# Patient Record
Sex: FEMALE | Race: WHITE | Employment: OTHER | URBAN - NONMETROPOLITAN AREA
[De-identification: names, ages, dates, MRNs, and addresses within clinical notes are randomized per-mention and may not be internally consistent; named-entity substitution may affect disease eponyms.]

---

## 2017-04-10 ENCOUNTER — OFFICE VISIT (OUTPATIENT)
Dept: PHYSICAL MEDICINE AND REHAB | Age: 62
End: 2017-04-10

## 2017-04-10 VITALS
BODY MASS INDEX: 25.3 KG/M2 | HEIGHT: 61 IN | DIASTOLIC BLOOD PRESSURE: 87 MMHG | HEART RATE: 97 BPM | WEIGHT: 134 LBS | SYSTOLIC BLOOD PRESSURE: 133 MMHG

## 2017-04-10 DIAGNOSIS — G25.2 ACTION TREMOR: Primary | ICD-10-CM

## 2017-04-10 PROCEDURE — 99213 OFFICE O/P EST LOW 20 MIN: CPT | Performed by: PSYCHIATRY & NEUROLOGY

## 2017-05-24 LAB — VITAMIN D 25-HYDROXY: 44 NG/ML

## 2017-05-30 ENCOUNTER — TELEPHONE (OUTPATIENT)
Dept: FAMILY MEDICINE CLINIC | Age: 62
End: 2017-05-30

## 2017-05-30 DIAGNOSIS — E55.9 VITAMIN D DEFICIENCY: Primary | ICD-10-CM

## 2017-10-12 ENCOUNTER — OFFICE VISIT (OUTPATIENT)
Dept: ENT CLINIC | Age: 62
End: 2017-10-12
Payer: COMMERCIAL

## 2017-10-12 VITALS
TEMPERATURE: 98.3 F | HEART RATE: 80 BPM | WEIGHT: 130.6 LBS | DIASTOLIC BLOOD PRESSURE: 60 MMHG | HEIGHT: 62 IN | BODY MASS INDEX: 24.03 KG/M2 | SYSTOLIC BLOOD PRESSURE: 102 MMHG | RESPIRATION RATE: 14 BRPM

## 2017-10-12 DIAGNOSIS — E04.2 MULTINODULAR GOITER: Primary | ICD-10-CM

## 2017-10-12 PROCEDURE — 99212 OFFICE O/P EST SF 10 MIN: CPT | Performed by: OTOLARYNGOLOGY

## 2017-10-12 ASSESSMENT — ENCOUNTER SYMPTOMS
STRIDOR: 0
COLOR CHANGE: 0
CHOKING: 0
SHORTNESS OF BREATH: 0
APNEA: 0
NAUSEA: 0
SINUS PRESSURE: 0
VOMITING: 0
TROUBLE SWALLOWING: 1
SORE THROAT: 0
CHEST TIGHTNESS: 0
RHINORRHEA: 0
ABDOMINAL PAIN: 0
VOICE CHANGE: 0
FACIAL SWELLING: 0
DIARRHEA: 0
COUGH: 0
WHEEZING: 0

## 2017-10-12 NOTE — PROGRESS NOTES
[facility]  [facility]  [address]  [place]  Dept: [phone]  Dept Fax: [phone]  Loc: [phone]    Brock Ndiaye is a 60 y.o. female who was referred by No ref. provider found for:  Chief Complaint   Patient presents with    Thyroid Problem     New patient is here for thyroid nodule. last US 9/22/15   . HPI:     Brock Ndiaye is a 60 y.o. female who presents today for Follow-up on her nodular thyroid gland. Her last ultrasound was in 2015. That showed 6 nodules in the left side, only one of them greater than a centimeter. That one was 14 mm and had been previously biopsied. Ajith Stewart History: Allergies   Allergen Reactions    Latex Hives    Prolia [Denosumab] Other (See Comments)     Hand and feet tingle and throat tightness    Amoxicillin Rash     Current Outpatient Prescriptions   Medication Sig Dispense Refill    ADVAIR DISKUS 100-50 MCG/DOSE diskus inhaler USE 1 INHALATION TWICE A  each 3    docusate sodium (COLACE) 100 MG capsule Take 100 mg by mouth 2 times daily as needed       Calcium Citrate-Vitamin D (CALCIUM CITRATE + PO) Take 630 mg by mouth daily. Two daily      Cholecalciferol (VITAMIN D3 PO) Take 5,000 Units by mouth daily       magnesium 30 MG tablet Take 30 mg by mouth 2 times daily.  MILK THISTLE PO Take  by mouth.  Coenzyme Q10 (COQ10 PO) Take  by mouth.  Omega-3 Fatty Acids (OMEGA 3 PO) Take  by mouth.  Cyanocobalamin (VITAMIN B 12 PO) Take  by mouth.  multivitamin (THERAGRAN) per tablet Take 1 tablet by mouth daily.         ZINC PO Take by mouth      TURMERIC PO Take by mouth      levalbuterol (XOPENEX HFA) 45 MCG/ACT inhaler Inhale 1 puff into the lungs every 4 hours as needed for Wheezing 2 Inhaler 3    raloxifene (EVISTA) 60 MG tablet Take 1 tablet by mouth daily 90 tablet 3    sulfamethoxazole-trimethoprim (BACTRIM DS) 800-160 MG per tablet Take 1 tablet by mouth 2 times daily for 10 days 20 tablet 0     No current facility-administered medications for this visit. Past Medical History:   Diagnosis Date    Asthma     Essential tremor 2011    Fx. left wrist 5/20/14    GERD (gastroesophageal reflux disease)     Inflammatory polyps of colon ([facility] [address] 12/2013    Osteoarthritis     cervical    Osteoporosis 2013    Tremors of nervous system     Begnin essential tremors      Past Surgical History:   Procedure Laterality Date    APPENDECTOMY      HYSTERECTOMY       Family History   Problem Relation Age of Onset    Cancer Mother     Other Mother      tremor    Heart Disease Father     Kidney Disease Father     Other Father      Inability to produce blood    Other Maternal Grandmother      tremor     Social History   Substance Use Topics    Smoking status: Current Every Day Smoker     Packs/day: 0.50     Years: 2.00     Types: Cigarettes     Last attempt to quit: 12/1/2015    Smokeless tobacco: Never Used    Alcohol use 0.0 oz/week      Comment: occ       Subjective:      Review of Systems   Constitutional: Negative for activity change, appetite change, chills, diaphoresis, fatigue, fever and unexpected weight change. HENT: Positive for trouble swallowing. Negative for congestion, dental problem, ear discharge, ear pain, facial swelling, hearing loss, mouth sores, nosebleeds, postnasal drip, rhinorrhea, sinus pressure, sneezing, sore throat, tinnitus and voice change. Eyes: Negative for visual disturbance. Respiratory: Negative for apnea, cough, choking, chest tightness, shortness of breath, wheezing and stridor. Cardiovascular: Negative for chest pain, palpitations and leg swelling. Gastrointestinal: Negative for abdominal pain, diarrhea, nausea and vomiting. Endocrine: Negative for cold intolerance, heat intolerance, polydipsia and polyuria. Genitourinary: Negative for dysuria, enuresis and hematuria.    Musculoskeletal: Negative for arthralgias, gait problem, neck pain and neck stiffness. Skin: Negative for color change and rash. Allergic/Immunologic: Negative for environmental allergies, food allergies and immunocompromised state. Neurological: Positive for tremors. Negative for dizziness, syncope, facial asymmetry, speech difficulty, light-headedness and headaches. Hematological: Negative for adenopathy. Does not bruise/bleed easily. Psychiatric/Behavioral: Negative for confusion and sleep disturbance. The patient is not nervous/anxious. Objective:   /60 (Site: Left Arm, Position: Sitting)   Pulse 80   Temp 98.3 °F (36.8 °C) (Oral)   Resp 14   Ht 5' 1.5\" (1.562 m)   Wt 130 lb 9.6 oz (59.2 kg)   BMI 24.28 kg/m²     Physical Exam   Constitutional: She is oriented to person, place, and time. She appears well-developed and well-nourished. She is cooperative. HENT:   Head: Normocephalic and atraumatic. Head is without laceration. Right Ear: Hearing, tympanic membrane, external ear and ear canal normal. No drainage or swelling. Tympanic membrane is not scarred, not perforated and not erythematous. Tympanic membrane mobility is normal (on pneumatic massage). No middle ear effusion. Left Ear: Hearing, tympanic membrane, external ear and ear canal normal. No drainage or swelling. Tympanic membrane is not scarred, not perforated and not erythematous. Tympanic membrane mobility is normal (on pneumatic massage). No middle ear effusion. Nose: Nose normal. No mucosal edema, rhinorrhea or septal deviation. Mouth/Throat: Uvula is midline, oropharynx is clear and moist and mucous membranes are normal. Mucous membranes are not pale and not dry. No oral lesions. No uvula swelling. No oropharyngeal exudate, posterior oropharyngeal edema or posterior oropharyngeal erythema.    Turbinates: normal  LIps: lips normal    Mallampati 1  Tonsils:Unremarkable  Base of tongue: symmetric,  Larynx, mirror exam: unable due to gag reflex     Eyes:

## 2017-10-17 ENCOUNTER — HOSPITAL ENCOUNTER (OUTPATIENT)
Dept: ULTRASOUND IMAGING | Age: 62
Discharge: HOME | End: 2017-10-17
Payer: COMMERCIAL

## 2017-10-17 DIAGNOSIS — E04.2 MULTINODULAR GOITER: ICD-10-CM

## 2017-10-17 PROCEDURE — 76536 US EXAM OF HEAD AND NECK: CPT

## 2017-10-18 ENCOUNTER — HOSPITAL ENCOUNTER (OUTPATIENT)
Dept: ULTRASOUND IMAGING | Age: 62
Discharge: HOME | End: 2017-10-18
Payer: COMMERCIAL

## 2017-10-18 DIAGNOSIS — Z00.6 EXAMINATION FOR NORMAL COMPARISON FOR CLINICAL RESEARCH: ICD-10-CM

## 2017-10-18 PROCEDURE — 3209999900 US COMPARISON OF OUTSIDE FILMS

## 2017-10-23 ENCOUNTER — OFFICE VISIT (OUTPATIENT)
Dept: FAMILY MEDICINE CLINIC | Age: 62
End: 2017-10-23

## 2017-10-23 VITALS
WEIGHT: 131.38 LBS | RESPIRATION RATE: 16 BRPM | HEIGHT: 62 IN | SYSTOLIC BLOOD PRESSURE: 130 MMHG | HEART RATE: 84 BPM | DIASTOLIC BLOOD PRESSURE: 74 MMHG | BODY MASS INDEX: 24.18 KG/M2

## 2017-10-23 DIAGNOSIS — Z00.00 WELL ADULT EXAM: Primary | ICD-10-CM

## 2017-10-23 DIAGNOSIS — M81.0 AGE RELATED OSTEOPOROSIS, UNSPECIFIED PATHOLOGICAL FRACTURE PRESENCE: ICD-10-CM

## 2017-10-23 DIAGNOSIS — Z23 NEED FOR IMMUNIZATION AGAINST INFLUENZA: ICD-10-CM

## 2017-10-23 DIAGNOSIS — E55.9 VITAMIN D DEFICIENCY: ICD-10-CM

## 2017-10-23 DIAGNOSIS — Z12.31 SCREENING MAMMOGRAM, ENCOUNTER FOR: ICD-10-CM

## 2017-10-23 DIAGNOSIS — J45.909 RAD (REACTIVE AIRWAY DISEASE), UNSPECIFIED ASTHMA SEVERITY, UNCOMPLICATED: ICD-10-CM

## 2017-10-23 DIAGNOSIS — E78.00 PURE HYPERCHOLESTEROLEMIA: ICD-10-CM

## 2017-10-23 DIAGNOSIS — J06.9 UPPER RESPIRATORY TRACT INFECTION, UNSPECIFIED TYPE: ICD-10-CM

## 2017-10-23 DIAGNOSIS — E04.1 THYROID NODULE: ICD-10-CM

## 2017-10-23 PROCEDURE — 90471 IMMUNIZATION ADMIN: CPT | Performed by: FAMILY MEDICINE

## 2017-10-23 PROCEDURE — 90674 CCIIV4 VAC NO PRSV 0.5 ML IM: CPT | Performed by: FAMILY MEDICINE

## 2017-10-23 PROCEDURE — 99396 PREV VISIT EST AGE 40-64: CPT | Performed by: FAMILY MEDICINE

## 2017-10-23 RX ORDER — RALOXIFENE HYDROCHLORIDE 60 MG/1
60 TABLET, FILM COATED ORAL DAILY
Qty: 90 TABLET | Refills: 3 | Status: SHIPPED | OUTPATIENT
Start: 2017-10-23 | End: 2018-10-18 | Stop reason: SDUPTHER

## 2017-10-23 RX ORDER — SULFAMETHOXAZOLE AND TRIMETHOPRIM 800; 160 MG/1; MG/1
1 TABLET ORAL 2 TIMES DAILY
Qty: 20 TABLET | Refills: 0 | Status: SHIPPED | OUTPATIENT
Start: 2017-10-23 | End: 2018-02-05 | Stop reason: SDUPTHER

## 2017-10-23 RX ORDER — LEVALBUTEROL TARTRATE 45 UG/1
1 AEROSOL, METERED ORAL EVERY 4 HOURS PRN
Qty: 2 INHALER | Refills: 3 | Status: SHIPPED | OUTPATIENT
Start: 2017-10-23 | End: 2017-10-31 | Stop reason: SDUPTHER

## 2017-10-23 ASSESSMENT — PATIENT HEALTH QUESTIONNAIRE - PHQ9
SUM OF ALL RESPONSES TO PHQ9 QUESTIONS 1 & 2: 0
1. LITTLE INTEREST OR PLEASURE IN DOING THINGS: 0
2. FEELING DOWN, DEPRESSED OR HOPELESS: 0
SUM OF ALL RESPONSES TO PHQ QUESTIONS 1-9: 0

## 2017-10-23 ASSESSMENT — ENCOUNTER SYMPTOMS
COUGH: 1
SINUS PRESSURE: 0
CONSTIPATION: 0
SHORTNESS OF BREATH: 0

## 2017-10-23 NOTE — PROGRESS NOTES
Subjective:      Patient ID: Tre Elias is a 60 y.o. female. HPI  Well Adult Physical   Patient here for a comprehensive physical exam.The patient reports problems - there has been some loose cough the past several weeks. She continues to smoke and we discussed that  Do you take any herbs or supplements that were not prescribed by a doctor? yes Are you taking calcium supplements? yes Are you taking aspirin daily? no   History:  She is not seeing a GYN    Review of Systems   Constitutional: Negative for fatigue. HENT: Positive for congestion. Negative for sinus pressure. Eyes: Negative for visual disturbance. Respiratory: Positive for cough. Negative for shortness of breath. Cardiovascular: Negative for chest pain. Gastrointestinal: Negative for constipation. Genitourinary: Negative. Musculoskeletal: Negative for arthralgias. Skin: Negative for rash. Neurological: Positive for tremors. Negative for headaches. The patient's medications, allergies, past medical problems, surgical, social, and family histories were reviewed and updated as needed. Objective:   Physical Exam   Constitutional: She is oriented to person, place, and time. She appears well-developed and well-nourished. No distress. HENT:   Head: Normocephalic and atraumatic. Right Ear: External ear normal.   Left Ear: External ear normal.   Nose: Nose normal.   Mouth/Throat: Oropharynx is clear and moist. No oropharyngeal exudate. Nasal congestion   Eyes: Conjunctivae are normal. No scleral icterus. Neck: Neck supple. Carotid bruit is not present. No tracheal deviation present. No thyromegaly present. Cardiovascular: Normal rate, regular rhythm, normal heart sounds and intact distal pulses. Pulmonary/Chest: Effort normal and breath sounds normal.   Abdominal: Soft. Bowel sounds are normal. She exhibits no mass. Musculoskeletal: She exhibits no edema. Lymphadenopathy:     She has no cervical adenopathy. Neurological: She is alert and oriented to person, place, and time. Skin: Skin is warm and dry. Psychiatric: She has a normal mood and affect. Her behavior is normal.   Blood pressure 130/74, pulse 84, resp. rate 16, height 5' 1.5\" (1.562 m), weight 131 lb 6 oz (59.6 kg), not currently breastfeeding. Assessment:      1. Well adult exam     2. Need for immunization against influenza  INFLUENZA, MDCK QUADV, 4 YRS AND OLDER, IM, PF, PREFILL SYR OR SDV, 0.5ML (FLUCELVAX QUADV, PF)   3. RAD (reactive airway disease), unspecified asthma severity, uncomplicated  levalbuterol (XOPENEX HFA) 45 MCG/ACT inhaler   4. Age related osteoporosis, unspecified pathological fracture presence  raloxifene (EVISTA) 60 MG tablet    [facility] Dexa Bone Density Scan    Comprehensive Metabolic Panel   5. Vitamin D deficiency  Vitamin D 25 Hydroxy   6. Screening mammogram, encounter for  [facility] DIGITAL SCREEN W OR WO CAD BILATERAL   7. Pure hypercholesterolemia  Comprehensive Metabolic Panel    Lipid Panel   8. Thyroid nodule  TSH without Reflex    T4, Free   9. Upper respiratory tract infection, unspecified type             Plan:      Keep working at not smoking.   Do the fasting lab, mammogram, and DEXA scan in mid December   See me in 6 month

## 2017-10-23 NOTE — PATIENT INSTRUCTIONS
Keep working at not smoking.   Do the fasting lab, mammogram, and DEXA scan in mid December   See me in 6 month

## 2017-10-24 ENCOUNTER — OFFICE VISIT (OUTPATIENT)
Dept: ENT CLINIC | Age: 62
End: 2017-10-24
Payer: COMMERCIAL

## 2017-10-24 VITALS
DIASTOLIC BLOOD PRESSURE: 64 MMHG | SYSTOLIC BLOOD PRESSURE: 106 MMHG | HEART RATE: 60 BPM | TEMPERATURE: 98 F | WEIGHT: 130.7 LBS | RESPIRATION RATE: 14 BRPM | BODY MASS INDEX: 24.05 KG/M2 | HEIGHT: 62 IN

## 2017-10-24 DIAGNOSIS — R06.83 SNORING: ICD-10-CM

## 2017-10-24 DIAGNOSIS — G47.30 SLEEP-RELATED BREATHING DISORDER: ICD-10-CM

## 2017-10-24 DIAGNOSIS — E04.2 MULTINODULAR GOITER: Primary | ICD-10-CM

## 2017-10-24 DIAGNOSIS — R53.83 TIRED: ICD-10-CM

## 2017-10-24 DIAGNOSIS — R25.1 TREMOR: ICD-10-CM

## 2017-10-24 PROCEDURE — 99214 OFFICE O/P EST MOD 30 MIN: CPT | Performed by: OTOLARYNGOLOGY

## 2017-10-24 ASSESSMENT — ENCOUNTER SYMPTOMS
APNEA: 0
FACIAL SWELLING: 0
RHINORRHEA: 0
CHEST TIGHTNESS: 0
COLOR CHANGE: 0
SHORTNESS OF BREATH: 0
WHEEZING: 0
VOMITING: 0
CHOKING: 0
STRIDOR: 0
DIARRHEA: 0
SINUS PRESSURE: 0
ABDOMINAL PAIN: 0
SORE THROAT: 0
VOICE CHANGE: 0
COUGH: 0
TROUBLE SWALLOWING: 0
NAUSEA: 0

## 2017-10-24 NOTE — LETTER
[facility]  [facility] [address]  [address]  [address]  Phone: [phone]  Fax: [phone]    Brock Barnes MD        October 24, 2017    Tre Moore, [address] [facility] [address]    Patient: Janet Pino   MR Number: [id number]   YOB: 1955   Date of Visit: 10/24/2017     Dear Tre Moore,    I recently saw your patient, Janet Pino, regarding Recent thyroid ultrasound. No indication for an FNA. She does have rather impressive history of weight gain, and her  has noted that she has obstructive snoring. He believes she has sleep apnea. She is indeed fatigued all the time. Below are the relevant portions of my assessment and plan of care. Assessment & Plan   Diagnoses and all orders for this visit:    1. Multinodular goiter  T3, Free    US Thyroid   2. Tremor     3. Tired  Ambulatory referral to Sleep Medicine   4. Snoring  Ambulatory referral to Sleep Medicine   5. Sleep-related breathing disorder         The findings were explained and her questions were answered. Options were discussed including getting a repeat Ultrasound in 1 year. Rather than wait until December, because of her weight gain, we will get thyroid labs done today. I also ordered a T3 to be done along with the  T4 and TSH that were ordered by her PCP. I will also refer [facility] to [facility] for a sleep [facility]. I will see her back after her Ultrasound in 1 year. Return in about 1 year (around 10/26/2018) for Follow-Up thyroid ultrasound. If you have questions, please do not hesitate to call me. I look forward to following Daria Been along with you.     Sincerely,          Brock Barnes MD

## 2017-10-24 NOTE — PROGRESS NOTES
CITRATE + PO) Take 630 mg by mouth daily. Two daily      Cholecalciferol (VITAMIN D3 PO) Take 5,000 Units by mouth daily       magnesium 30 MG tablet Take 30 mg by mouth 2 times daily.  MILK THISTLE PO Take  by mouth.  Coenzyme Q10 (COQ10 PO) Take  by mouth.  Omega-3 Fatty Acids (OMEGA 3 PO) Take  by mouth.  Cyanocobalamin (VITAMIN B 12 PO) Take  by mouth.  multivitamin (THERAGRAN) per tablet Take 1 tablet by mouth daily. No current facility-administered medications for this visit. Past Medical History:   Diagnosis Date    Asthma     Essential tremor 2011    Fx. left wrist 5/20/14    GERD (gastroesophageal reflux disease)     Inflammatory polyps of colon ([address] 12/2013    Osteoarthritis     cervical    Osteoporosis 2013    Tremors of nervous system     Begnin essential tremors      Past Surgical History:   Procedure Laterality Date    APPENDECTOMY      HYSTERECTOMY       Family History   Problem Relation Age of Onset    Cancer Mother     Other Mother      tremor    Heart Disease Father     Kidney Disease Father     Other Father      Inability to produce blood    Other Maternal Grandmother      tremor     Social History   Substance Use Topics    Smoking status: Current Every Day Smoker     Packs/day: 0.50     Years: 2.00     Types: Cigarettes     Last attempt to quit: 12/1/2015    Smokeless tobacco: Never Used    Alcohol use 0.0 oz/week      Comment: occ       Subjective:      Review of Systems   Constitutional: Negative for activity change, appetite change, chills, diaphoresis, fatigue, fever and unexpected weight change. HENT: Negative for congestion, dental problem, ear discharge, ear pain, facial swelling, hearing loss, mouth sores, nosebleeds, postnasal drip, rhinorrhea, sinus pressure, sneezing, sore throat, tinnitus, trouble swallowing and voice change. Eyes: Negative for visual disturbance.    Respiratory: Negative for apnea, cough, choking, will see her back after her Ultrasound in 1 year. Return in about 1 year (around 10/26/2018) for Follow-Up thyroid ultrasound. Etelvina Pedersen CMA ([facility]), am scribing for, and in the presence of Dr. Marshall Moore. Electronically signed by Joyce Squires on 10/24/17 at 8:33 AM.     (Please note that portions of this note were completed with a voice recognition program. Efforts were made to edit the dictations but occasionally words are mis-transcribed.)    I agree to the above documentation placed by my scribe. I have personally evaluated this patient. Additional findings are as noted. I reviewed the scribe's note and agree with the documented findings and plan of care. Any areas of disagreement are corrected. I agree with the chief complaint, past medical history, past surgical history, allergies, medications, social and family history as documented unless otherwise noted below.      Electronically signed by Kamila Gonzalez MD on 10/24/2017 at 9:19 AM

## 2017-10-25 LAB
T4 FREE: 0.97 NG/DL (ref 0.8–1.8)
TSH SERPL DL<=0.05 MIU/L-ACNC: 2.02 UIU/ML (ref 0.4–4.4)

## 2017-10-30 ENCOUNTER — TELEPHONE (OUTPATIENT)
Dept: FAMILY MEDICINE CLINIC | Age: 62
End: 2017-10-30

## 2017-10-30 DIAGNOSIS — J45.909 RAD (REACTIVE AIRWAY DISEASE), UNSPECIFIED ASTHMA SEVERITY, UNCOMPLICATED: ICD-10-CM

## 2017-10-31 RX ORDER — LEVALBUTEROL TARTRATE 45 UG/1
1 AEROSOL, METERED ORAL EVERY 4 HOURS PRN
Qty: 2 INHALER | Refills: 3 | Status: SHIPPED | OUTPATIENT
Start: 2017-10-31 | End: 2019-10-28 | Stop reason: SDUPTHER

## 2017-10-31 NOTE — TELEPHONE ENCOUNTER
Can we do a prior auth as she has used proair, proventil and ventolin and they all exacerbate her pre existing tremor

## 2017-10-31 NOTE — TELEPHONE ENCOUNTER
Case was approved and and is good from 10/1/17 to 10/30/20 case # is [id number] due to pt having had tried proair,proventil and ventolin as they exacerbate her pre exsiting tremors

## 2017-12-12 LAB
ALBUMIN SERPL-MCNC: 4.2 G/DL (ref 3.2–5.3)
ALK PHOSPHATASE: 64 IU/L (ref 35–121)
ALT SERPL-CCNC: 18 IU/L (ref 5–59)
ANION GAP SERPL CALCULATED.3IONS-SCNC: 13 MMOL/L
AST SERPL-CCNC: 22 IU/L (ref 10–42)
BILIRUB SERPL-MCNC: 0.4 MG/DL (ref 0.2–1.3)
BUN BLDV-MCNC: 20 MG/DL (ref 10–20)
CALCIUM SERPL-MCNC: 9.2 MG/DL (ref 8.7–10.8)
CHLORIDE BLD-SCNC: 106 MMOL/L (ref 95–111)
CHOLESTEROL/HDL RATIO: 2.6
CHOLESTEROL: 196 MG/DL
CO2: 27 MMOL/L (ref 21–32)
CREAT SERPL-MCNC: 0.7 MG/DL (ref 0.5–1.3)
EGFR AFRICAN AMERICAN: 102
EGFR IF NONAFRICAN AMERICAN: 85
GLUCOSE: 86 MG/DL (ref 70–100)
HDLC SERPL-MCNC: 76 MG/DL (ref 40–60)
LDL CHOLESTEROL CALCULATED: 108 MG/DL
LDL/HDL RATIO: 1.4
POTASSIUM SERPL-SCNC: 5 MMOL/L (ref 3.5–5.4)
SODIUM BLD-SCNC: 141 MMOL/L (ref 134–147)
TOTAL PROTEIN: 6.6 G/DL (ref 5.8–8)
TRIGL SERPL-MCNC: 58 MG/DL
VLDLC SERPL CALC-MCNC: 12 MG/DL

## 2017-12-13 DIAGNOSIS — E55.9 VITAMIN D DEFICIENCY: Primary | ICD-10-CM

## 2017-12-13 LAB — VITAMIN D 25-HYDROXY: 75 NG/ML

## 2017-12-14 ENCOUNTER — TELEPHONE (OUTPATIENT)
Dept: FAMILY MEDICINE CLINIC | Age: 62
End: 2017-12-14

## 2017-12-14 NOTE — TELEPHONE ENCOUNTER
----- Message from Kj Agustin MD sent at 12/13/2017  9:47 PM EST -----  Let her know the labs looked well. No change in the meds.  Check the non fasting vit D level in mid June

## 2017-12-18 DIAGNOSIS — M81.0 AGE RELATED OSTEOPOROSIS, UNSPECIFIED PATHOLOGICAL FRACTURE PRESENCE: ICD-10-CM

## 2017-12-26 ENCOUNTER — TELEPHONE (OUTPATIENT)
Dept: FAMILY MEDICINE CLINIC | Age: 62
End: 2017-12-26

## 2017-12-26 NOTE — TELEPHONE ENCOUNTER
----- Message from De Mackenzie MD sent at 12/25/2017 10:06 PM EST -----  Let her know that while the DEXA showed osteoporosis it was better from before. She should continue the vit D3, evista and be sure she gets 1500 mg of calcium every day. Check the DEXA in a year.

## 2018-02-05 ENCOUNTER — OFFICE VISIT (OUTPATIENT)
Dept: FAMILY MEDICINE CLINIC | Age: 63
End: 2018-02-05

## 2018-02-05 VITALS
WEIGHT: 135.38 LBS | RESPIRATION RATE: 16 BRPM | HEART RATE: 84 BPM | HEIGHT: 62 IN | DIASTOLIC BLOOD PRESSURE: 60 MMHG | SYSTOLIC BLOOD PRESSURE: 96 MMHG | BODY MASS INDEX: 24.91 KG/M2

## 2018-02-05 DIAGNOSIS — J06.9 UPPER RESPIRATORY TRACT INFECTION, UNSPECIFIED TYPE: ICD-10-CM

## 2018-02-05 PROCEDURE — 99213 OFFICE O/P EST LOW 20 MIN: CPT | Performed by: FAMILY MEDICINE

## 2018-02-05 RX ORDER — SULFAMETHOXAZOLE AND TRIMETHOPRIM 800; 160 MG/1; MG/1
1 TABLET ORAL 2 TIMES DAILY
Qty: 20 TABLET | Refills: 0 | Status: SHIPPED | OUTPATIENT
Start: 2018-02-05 | End: 2018-02-15

## 2018-02-05 ASSESSMENT — ENCOUNTER SYMPTOMS
NAUSEA: 0
CONSTIPATION: 0
SINUS PRESSURE: 0
CHEST TIGHTNESS: 1
SHORTNESS OF BREATH: 0
DIARRHEA: 0
COUGH: 1
RHINORRHEA: 1
SORE THROAT: 0

## 2018-03-26 ENCOUNTER — OFFICE VISIT (OUTPATIENT)
Dept: NEUROLOGY | Age: 63
End: 2018-03-26

## 2018-03-26 VITALS
WEIGHT: 135 LBS | DIASTOLIC BLOOD PRESSURE: 82 MMHG | SYSTOLIC BLOOD PRESSURE: 120 MMHG | BODY MASS INDEX: 24.84 KG/M2 | HEART RATE: 95 BPM | HEIGHT: 62 IN

## 2018-03-26 DIAGNOSIS — G25.0 ESSENTIAL TREMOR: Primary | ICD-10-CM

## 2018-03-26 PROCEDURE — 99212 OFFICE O/P EST SF 10 MIN: CPT | Performed by: PSYCHIATRY & NEUROLOGY

## 2018-03-26 NOTE — PROGRESS NOTES
NEUROLOGY OUT PATIENT FOLLOW UP NOTE:  3/26/149130:59 AM    Jimy Luke is here for follow up for essential tremor. She is doing the same with her tremor. She denies any falls. She had testing performed, she is here to go over options of medications going forward. She denies any headache. She is no longer smoking. ROS:  Respiratory : no cough, no hemoptysis. Skin: no rash  Renal : no flank pain, no hematuria        Allergies   Allergen Reactions    Latex Hives    Prolia [Denosumab] Other (See Comments)     Hand and feet tingle and throat tightness    Amoxicillin Rash       Current Outpatient Prescriptions:     levalbuterol (XOPENEX HFA) 45 MCG/ACT inhaler, Inhale 1 puff into the lungs every 4 hours as needed for Wheezing, Disp: 2 Inhaler, Rfl: 3    ZINC PO, Take by mouth, Disp: , Rfl:     TURMERIC PO, Take by mouth, Disp: , Rfl:     raloxifene (EVISTA) 60 MG tablet, Take 1 tablet by mouth daily, Disp: 90 tablet, Rfl: 3    ADVAIR DISKUS 100-50 MCG/DOSE diskus inhaler, USE 1 INHALATION TWICE A DAY, Disp: 180 each, Rfl: 3    docusate sodium (COLACE) 100 MG capsule, Take 100 mg by mouth 2 times daily as needed , Disp: , Rfl:     Calcium Citrate-Vitamin D (CALCIUM CITRATE + PO), Take 630 mg by mouth daily. Two daily, Disp: , Rfl:     Cholecalciferol (VITAMIN D3 PO), Take 5,000 Units by mouth daily , Disp: , Rfl:     magnesium 30 MG tablet, Take 30 mg by mouth 2 times daily. , Disp: , Rfl:     MILK THISTLE PO, Take  by mouth., Disp: , Rfl:     Coenzyme Q10 (COQ10 PO), Take  by mouth., Disp: , Rfl:     Omega-3 Fatty Acids (OMEGA 3 PO), Take  by mouth., Disp: , Rfl:     Cyanocobalamin (VITAMIN B 12 PO), Take  by mouth., Disp: , Rfl:     multivitamin (THERAGRAN) per tablet, Take 1 tablet by mouth daily.   , Disp: , Rfl:       PE:   Vitals:    03/26/18 1142   BP: 120/82   Site: Left Arm   Position: Sitting   Pulse: 95   Weight: 135 lb (61.2 kg)   Height: 5' 1.5\" (1.562 m)     Mental status -

## 2018-04-23 ENCOUNTER — OFFICE VISIT (OUTPATIENT)
Dept: FAMILY MEDICINE CLINIC | Age: 63
End: 2018-04-23

## 2018-04-23 VITALS
RESPIRATION RATE: 16 BRPM | BODY MASS INDEX: 25.05 KG/M2 | HEIGHT: 62 IN | WEIGHT: 136.13 LBS | DIASTOLIC BLOOD PRESSURE: 60 MMHG | SYSTOLIC BLOOD PRESSURE: 110 MMHG | HEART RATE: 80 BPM

## 2018-04-23 DIAGNOSIS — M81.0 AGE RELATED OSTEOPOROSIS, UNSPECIFIED PATHOLOGICAL FRACTURE PRESENCE: Primary | ICD-10-CM

## 2018-04-23 PROCEDURE — 99213 OFFICE O/P EST LOW 20 MIN: CPT | Performed by: FAMILY MEDICINE

## 2018-04-23 ASSESSMENT — ENCOUNTER SYMPTOMS
SINUS PRESSURE: 0
CONSTIPATION: 0
COUGH: 1
SHORTNESS OF BREATH: 1

## 2018-04-23 ASSESSMENT — PATIENT HEALTH QUESTIONNAIRE - PHQ9
SUM OF ALL RESPONSES TO PHQ QUESTIONS 1-9: 0
2. FEELING DOWN, DEPRESSED OR HOPELESS: 0
SUM OF ALL RESPONSES TO PHQ9 QUESTIONS 1 & 2: 0
1. LITTLE INTEREST OR PLEASURE IN DOING THINGS: 0

## 2018-06-13 DIAGNOSIS — E55.9 VITAMIN D DEFICIENCY: Primary | ICD-10-CM

## 2018-06-13 DIAGNOSIS — E78.00 PURE HYPERCHOLESTEROLEMIA: ICD-10-CM

## 2018-06-13 LAB — VITAMIN D 25-HYDROXY: 68 NG/ML

## 2018-06-14 ENCOUNTER — TELEPHONE (OUTPATIENT)
Dept: FAMILY MEDICINE CLINIC | Age: 63
End: 2018-06-14

## 2018-10-18 ENCOUNTER — HOSPITAL ENCOUNTER (OUTPATIENT)
Dept: ULTRASOUND IMAGING | Age: 63
Discharge: HOME | End: 2018-10-18
Payer: COMMERCIAL

## 2018-10-18 DIAGNOSIS — M81.0 AGE RELATED OSTEOPOROSIS, UNSPECIFIED PATHOLOGICAL FRACTURE PRESENCE: ICD-10-CM

## 2018-10-18 DIAGNOSIS — E04.2 MULTINODULAR GOITER: ICD-10-CM

## 2018-10-18 PROCEDURE — 76536 US EXAM OF HEAD AND NECK: CPT

## 2018-10-19 ENCOUNTER — OFFICE VISIT (OUTPATIENT)
Dept: ENT CLINIC | Age: 63
End: 2018-10-19
Payer: COMMERCIAL

## 2018-10-19 VITALS
DIASTOLIC BLOOD PRESSURE: 74 MMHG | WEIGHT: 132.6 LBS | SYSTOLIC BLOOD PRESSURE: 116 MMHG | BODY MASS INDEX: 24.65 KG/M2 | TEMPERATURE: 98 F | RESPIRATION RATE: 12 BRPM | HEART RATE: 64 BPM

## 2018-10-19 DIAGNOSIS — G47.30 SLEEP-RELATED BREATHING DISORDER: ICD-10-CM

## 2018-10-19 DIAGNOSIS — E04.2 MULTINODULAR GOITER: Primary | ICD-10-CM

## 2018-10-19 DIAGNOSIS — R06.83 SNORING: ICD-10-CM

## 2018-10-19 PROCEDURE — 99212 OFFICE O/P EST SF 10 MIN: CPT | Performed by: OTOLARYNGOLOGY

## 2018-10-19 ASSESSMENT — ENCOUNTER SYMPTOMS
CHOKING: 0
STRIDOR: 0
SINUS PRESSURE: 0
ABDOMINAL PAIN: 0
NAUSEA: 0
FACIAL SWELLING: 0
SORE THROAT: 0
TROUBLE SWALLOWING: 0
APNEA: 0
SHORTNESS OF BREATH: 0
CHEST TIGHTNESS: 0
COLOR CHANGE: 0
DIARRHEA: 0
VOICE CHANGE: 0
VOMITING: 0
RHINORRHEA: 0
COUGH: 0
WHEEZING: 0

## 2018-10-19 NOTE — PROGRESS NOTES
visual disturbance. Respiratory: Negative for apnea, cough, choking, chest tightness, shortness of breath, wheezing and stridor. Cardiovascular: Negative for chest pain, palpitations and leg swelling. Gastrointestinal: Negative for abdominal pain, diarrhea, nausea and vomiting. Endocrine: Negative for cold intolerance, heat intolerance, polydipsia and polyuria. Genitourinary: Negative for dysuria, enuresis and hematuria. Musculoskeletal: Negative for arthralgias, gait problem, neck pain and neck stiffness. Skin: Negative for color change and rash. Allergic/Immunologic: Negative for environmental allergies, food allergies and immunocompromised state. Neurological: Negative for dizziness, syncope, facial asymmetry, speech difficulty, light-headedness and headaches. Hematological: Negative for adenopathy. Does not bruise/bleed easily. Psychiatric/Behavioral: Negative for confusion and sleep disturbance. The patient is not nervous/anxious. Objective:     /74 (Site: Left Upper Arm, Position: Sitting)   Pulse 64   Temp 98 °F (36.7 °C) (Oral)   Resp 12   Wt 132 lb 9.6 oz (60.1 kg)   BMI 24.65 kg/m²     Physical Exam    Data:  All of the past medical history, past surgical history, family history,social history, allergies and current medications were reviewed with the patient. Assessment & Plan   Diagnoses and all orders for this visit:     Diagnosis Orders   1. Multinodular goiter  US Thyroid   2. Snoring     3. Sleep-related breathing disorder       The findings were explained and her questions were answered. Options were discussed including a repeat ultrasound in a year. She agreed. Yani CRUZ CMA ([facility]), am scribing for, and in the presence of Dr. Lakhwinder Peralta.  Electronically signed by Sonal Guillen CMA ([facility]) on 10/19/18 at 9:22 AM.     (Please note that portions of this note were completed with a voice recognition program. Efforts were made to edit the dictations butoccasionally words are mis-transcribed.)    I agree to the above documentation placed by my scribe. I have personally evaluated this patient. Additional findings are as noted. I reviewed the scribe's note and agree with the documented findings and plan of care. Any areas of disagreement are corrected. I agree with the chief complaint, past medical history, past surgical history, allergies, medications, social and family history as documented unless otherwise noted below.      Electronically signed by Declan Cobos MD on 11/11/2018 at 9:04 PM

## 2018-10-23 ENCOUNTER — OFFICE VISIT (OUTPATIENT)
Dept: FAMILY MEDICINE CLINIC | Age: 63
End: 2018-10-23

## 2018-10-23 VITALS
SYSTOLIC BLOOD PRESSURE: 122 MMHG | RESPIRATION RATE: 13 BRPM | HEART RATE: 88 BPM | BODY MASS INDEX: 24.2 KG/M2 | WEIGHT: 131.5 LBS | DIASTOLIC BLOOD PRESSURE: 64 MMHG | HEIGHT: 62 IN

## 2018-10-23 DIAGNOSIS — J06.9 UPPER RESPIRATORY TRACT INFECTION, UNSPECIFIED TYPE: ICD-10-CM

## 2018-10-23 DIAGNOSIS — Z23 NEED FOR IMMUNIZATION AGAINST INFLUENZA: ICD-10-CM

## 2018-10-23 DIAGNOSIS — Z00.00 WELL ADULT EXAM: Primary | ICD-10-CM

## 2018-10-23 PROCEDURE — 90471 IMMUNIZATION ADMIN: CPT | Performed by: FAMILY MEDICINE

## 2018-10-23 PROCEDURE — 99396 PREV VISIT EST AGE 40-64: CPT | Performed by: FAMILY MEDICINE

## 2018-10-23 PROCEDURE — 90688 IIV4 VACCINE SPLT 0.5 ML IM: CPT | Performed by: FAMILY MEDICINE

## 2018-10-23 RX ORDER — PREDNISONE 20 MG/1
20 TABLET ORAL DAILY
Qty: 7 TABLET | Refills: 0 | Status: SHIPPED | OUTPATIENT
Start: 2018-10-23 | End: 2018-11-02

## 2018-10-23 RX ORDER — SULFAMETHOXAZOLE AND TRIMETHOPRIM 800; 160 MG/1; MG/1
1 TABLET ORAL 2 TIMES DAILY
Qty: 20 TABLET | Refills: 0 | Status: SHIPPED | OUTPATIENT
Start: 2018-10-23 | End: 2018-11-02

## 2018-10-23 ASSESSMENT — ENCOUNTER SYMPTOMS
SORE THROAT: 1
SHORTNESS OF BREATH: 0
COUGH: 1
EYE DISCHARGE: 1
SINUS PAIN: 1
SINUS PRESSURE: 1
DIARRHEA: 0

## 2018-10-23 ASSESSMENT — PATIENT HEALTH QUESTIONNAIRE - PHQ9
1. LITTLE INTEREST OR PLEASURE IN DOING THINGS: 0
SUM OF ALL RESPONSES TO PHQ QUESTIONS 1-9: 0
SUM OF ALL RESPONSES TO PHQ QUESTIONS 1-9: 0
2. FEELING DOWN, DEPRESSED OR HOPELESS: 0
SUM OF ALL RESPONSES TO PHQ9 QUESTIONS 1 & 2: 0

## 2018-10-25 RX ORDER — RALOXIFENE HYDROCHLORIDE 60 MG/1
TABLET, FILM COATED ORAL
Qty: 90 TABLET | Refills: 3 | Status: SHIPPED | OUTPATIENT
Start: 2018-10-25 | End: 2019-10-13 | Stop reason: SDUPTHER

## 2019-03-18 ENCOUNTER — OFFICE VISIT (OUTPATIENT)
Dept: NEUROLOGY | Age: 64
End: 2019-03-18
Payer: COMMERCIAL

## 2019-03-18 VITALS
DIASTOLIC BLOOD PRESSURE: 72 MMHG | BODY MASS INDEX: 24.11 KG/M2 | WEIGHT: 131 LBS | HEIGHT: 62 IN | HEART RATE: 68 BPM | SYSTOLIC BLOOD PRESSURE: 118 MMHG

## 2019-03-18 DIAGNOSIS — G25.0 ESSENTIAL TREMOR: Primary | ICD-10-CM

## 2019-03-18 PROCEDURE — 99213 OFFICE O/P EST LOW 20 MIN: CPT | Performed by: NURSE PRACTITIONER

## 2019-10-08 ENCOUNTER — HOSPITAL ENCOUNTER (OUTPATIENT)
Dept: ULTRASOUND IMAGING | Age: 64
Discharge: HOME | End: 2019-10-08
Payer: MEDICARE

## 2019-10-08 ENCOUNTER — OFFICE VISIT (OUTPATIENT)
Dept: ENT CLINIC | Age: 64
End: 2019-10-08
Payer: MEDICARE

## 2019-10-08 VITALS
SYSTOLIC BLOOD PRESSURE: 124 MMHG | TEMPERATURE: 98.3 F | DIASTOLIC BLOOD PRESSURE: 72 MMHG | RESPIRATION RATE: 16 BRPM | HEIGHT: 61 IN | HEART RATE: 88 BPM | WEIGHT: 120.8 LBS | BODY MASS INDEX: 22.81 KG/M2

## 2019-10-08 DIAGNOSIS — Z00.6 EXAMINATION FOR NORMAL COMPARISON FOR CLINICAL RESEARCH: ICD-10-CM

## 2019-10-08 DIAGNOSIS — E04.2 MULTINODULAR GOITER: Primary | ICD-10-CM

## 2019-10-08 PROCEDURE — G8399 PT W/DXA RESULTS DOCUMENT: HCPCS | Performed by: OTOLARYNGOLOGY

## 2019-10-08 PROCEDURE — G8420 CALC BMI NORM PARAMETERS: HCPCS | Performed by: OTOLARYNGOLOGY

## 2019-10-08 PROCEDURE — 1123F ACP DISCUSS/DSCN MKR DOCD: CPT | Performed by: OTOLARYNGOLOGY

## 2019-10-08 PROCEDURE — 4040F PNEUMOC VAC/ADMIN/RCVD: CPT | Performed by: OTOLARYNGOLOGY

## 2019-10-08 PROCEDURE — G8482 FLU IMMUNIZE ORDER/ADMIN: HCPCS | Performed by: OTOLARYNGOLOGY

## 2019-10-08 PROCEDURE — 3209999900 US COMPARISON OF OUTSIDE FILMS

## 2019-10-08 PROCEDURE — 3017F COLORECTAL CA SCREEN DOC REV: CPT | Performed by: OTOLARYNGOLOGY

## 2019-10-08 PROCEDURE — G8427 DOCREV CUR MEDS BY ELIG CLIN: HCPCS | Performed by: OTOLARYNGOLOGY

## 2019-10-08 PROCEDURE — 1090F PRES/ABSN URINE INCON ASSESS: CPT | Performed by: OTOLARYNGOLOGY

## 2019-10-08 PROCEDURE — 99212 OFFICE O/P EST SF 10 MIN: CPT | Performed by: OTOLARYNGOLOGY

## 2019-10-08 PROCEDURE — 4004F PT TOBACCO SCREEN RCVD TLK: CPT | Performed by: OTOLARYNGOLOGY

## 2019-10-08 ASSESSMENT — ENCOUNTER SYMPTOMS
WHEEZING: 0
COLOR CHANGE: 0
RHINORRHEA: 0
CHOKING: 0
VOICE CHANGE: 0
NAUSEA: 0
TROUBLE SWALLOWING: 0
SHORTNESS OF BREATH: 0
VOMITING: 0
CHEST TIGHTNESS: 0
ABDOMINAL PAIN: 0
COUGH: 1
SINUS PRESSURE: 1
APNEA: 0
DIARRHEA: 0
STRIDOR: 0
SORE THROAT: 0
FACIAL SWELLING: 0

## 2019-10-13 DIAGNOSIS — M81.0 AGE RELATED OSTEOPOROSIS, UNSPECIFIED PATHOLOGICAL FRACTURE PRESENCE: ICD-10-CM

## 2019-10-14 RX ORDER — RALOXIFENE HYDROCHLORIDE 60 MG/1
TABLET, FILM COATED ORAL
Qty: 90 TABLET | Refills: 0 | Status: SHIPPED | OUTPATIENT
Start: 2019-10-14 | End: 2020-01-15

## 2019-10-28 ENCOUNTER — OFFICE VISIT (OUTPATIENT)
Dept: FAMILY MEDICINE CLINIC | Age: 64
End: 2019-10-28

## 2019-10-28 VITALS
BODY MASS INDEX: 23.27 KG/M2 | HEART RATE: 84 BPM | SYSTOLIC BLOOD PRESSURE: 106 MMHG | WEIGHT: 123.25 LBS | RESPIRATION RATE: 16 BRPM | HEIGHT: 61 IN | DIASTOLIC BLOOD PRESSURE: 64 MMHG

## 2019-10-28 DIAGNOSIS — E78.00 PURE HYPERCHOLESTEROLEMIA: ICD-10-CM

## 2019-10-28 DIAGNOSIS — Z12.31 VISIT FOR SCREENING MAMMOGRAM: ICD-10-CM

## 2019-10-28 DIAGNOSIS — J45.909 RAD (REACTIVE AIRWAY DISEASE), UNSPECIFIED ASTHMA SEVERITY, UNCOMPLICATED: ICD-10-CM

## 2019-10-28 DIAGNOSIS — M81.0 AGE RELATED OSTEOPOROSIS, UNSPECIFIED PATHOLOGICAL FRACTURE PRESENCE: ICD-10-CM

## 2019-10-28 DIAGNOSIS — M79.672 LEFT FOOT PAIN: Primary | ICD-10-CM

## 2019-10-28 DIAGNOSIS — E55.9 VITAMIN D DEFICIENCY: ICD-10-CM

## 2019-10-28 PROCEDURE — 99214 OFFICE O/P EST MOD 30 MIN: CPT | Performed by: FAMILY MEDICINE

## 2019-10-28 RX ORDER — LEVALBUTEROL TARTRATE 45 UG/1
1 AEROSOL, METERED ORAL EVERY 4 HOURS PRN
Qty: 1 INHALER | Refills: 3 | Status: SHIPPED | OUTPATIENT
Start: 2019-10-28 | End: 2020-06-29 | Stop reason: SDUPTHER

## 2019-10-28 ASSESSMENT — PATIENT HEALTH QUESTIONNAIRE - PHQ9
2. FEELING DOWN, DEPRESSED OR HOPELESS: 0
SUM OF ALL RESPONSES TO PHQ9 QUESTIONS 1 & 2: 0
SUM OF ALL RESPONSES TO PHQ QUESTIONS 1-9: 0
SUM OF ALL RESPONSES TO PHQ QUESTIONS 1-9: 0
1. LITTLE INTEREST OR PLEASURE IN DOING THINGS: 0

## 2019-10-28 ASSESSMENT — ENCOUNTER SYMPTOMS
SHORTNESS OF BREATH: 1
CONSTIPATION: 0
SINUS PRESSURE: 0

## 2019-10-30 ENCOUNTER — TELEPHONE (OUTPATIENT)
Dept: FAMILY MEDICINE CLINIC | Age: 64
End: 2019-10-30

## 2019-10-30 LAB
ALBUMIN SERPL-MCNC: 4.1 G/DL (ref 3.2–5.3)
ALK PHOSPHATASE: 67 U/L (ref 39–130)
ALT SERPL-CCNC: 16 U/L (ref 0–31)
ANION GAP SERPL CALCULATED.3IONS-SCNC: 9 MMOL/L (ref 4–12)
AST SERPL-CCNC: 20 U/L (ref 0–41)
BILIRUB SERPL-MCNC: 0.5 MG/DL (ref 0.3–1.2)
BUN BLDV-MCNC: 17 MG/DL (ref 5–27)
CALCIUM SERPL-MCNC: 9.2 MG/DL (ref 8.5–10.5)
CHLORIDE BLD-SCNC: 105 MMOL/L (ref 98–109)
CHOLESTEROL/HDL RATIO: 2.9 (ref 1–5)
CHOLESTEROL: 211 MG/DL (ref 150–200)
CO2: 29 MMOL/L (ref 22–32)
CREAT SERPL-MCNC: 0.74 MG/DL (ref 0.4–1)
EGFR AFRICAN AMERICAN: >60 ML/MIN/1.73SQ.M
EGFR IF NONAFRICAN AMERICAN: >60 ML/MIN/1.73SQ.M
GLUCOSE: 83 MG/DL (ref 65–99)
HDLC SERPL-MCNC: 72 MG/DL
LDL CHOLESTEROL CALCULATED: 125 MG/DL
POTASSIUM SERPL-SCNC: 4.6 MMOL/L (ref 3.5–5)
SODIUM BLD-SCNC: 143 MMOL/L (ref 134–146)
TOTAL PROTEIN: 6.4 G/DL (ref 6–8)
TRIGL SERPL-MCNC: 69 MG/DL (ref 27–150)
VLDLC SERPL CALC-MCNC: 14 MG/DL (ref 0–30)

## 2019-11-01 LAB — VITAMIN D 25-HYDROXY: 65 NG/ML

## 2019-11-04 ENCOUNTER — TELEPHONE (OUTPATIENT)
Dept: FAMILY MEDICINE CLINIC | Age: 64
End: 2019-11-04

## 2019-11-15 ENCOUNTER — TELEPHONE (OUTPATIENT)
Dept: FAMILY MEDICINE CLINIC | Age: 64
End: 2019-11-15

## 2020-01-13 NOTE — TELEPHONE ENCOUNTER
Date of last visit:  10/28/2019  Date of next visit:  Visit date not found    Requested Prescriptions     Pending Prescriptions Disp Refills    raloxifene (EVISTA) 60 MG tablet [Pharmacy Med Name: RALOXIFENE HCL TABS 60MG] 90 tablet 4     Sig: TAKE 1 TABLET DAILY

## 2020-01-15 RX ORDER — ZOLEDRONIC ACID 5 MG/100ML
5 INJECTION, SOLUTION INTRAVENOUS ONCE
Qty: 100 ML | Refills: 0 | Status: SHIPPED | OUTPATIENT
Start: 2020-01-15 | End: 2020-09-11

## 2020-01-15 RX ORDER — RALOXIFENE HYDROCHLORIDE 60 MG/1
TABLET, FILM COATED ORAL
Qty: 90 TABLET | Refills: 4 | Status: SHIPPED | OUTPATIENT
Start: 2020-01-15 | End: 2021-04-09

## 2020-01-16 ENCOUNTER — TELEPHONE (OUTPATIENT)
Dept: FAMILY MEDICINE CLINIC | Age: 65
End: 2020-01-16

## 2020-03-13 ENCOUNTER — TELEPHONE (OUTPATIENT)
Dept: FAMILY MEDICINE CLINIC | Age: 65
End: 2020-03-13

## 2020-03-13 RX ORDER — AZITHROMYCIN 250 MG/1
TABLET, FILM COATED ORAL
Qty: 1 PACKET | Refills: 0 | Status: SHIPPED | OUTPATIENT
Start: 2020-03-13 | End: 2020-09-11

## 2020-03-13 NOTE — TELEPHONE ENCOUNTER
Patient called with complaints of:    Cough: Yes   SOB: Yes   Runny Nose: Yes   Sore Throat: Yes   Headache: Yes   Body Aches: No   Fever: Yes     - Exposure to the Flu: No   - Travel History: No   - Duration of Symptoms: 3 days    Please send RX to: [facility]    Patient informed if symptoms do not improve and/or  worsen then patient needs to go to Urgent Care or ER. Sore throat started Wednesday. Cough is productive. Has a hx of bronchitis and Dr Vinnie Pratt usually uses Sulfa based drugs. Advised to check pharmacy after 3 unless she hears from us.

## 2020-04-01 ENCOUNTER — TELEPHONE (OUTPATIENT)
Dept: FAMILY MEDICINE CLINIC | Age: 65
End: 2020-04-01

## 2020-04-01 RX ORDER — SULFAMETHOXAZOLE AND TRIMETHOPRIM 800; 160 MG/1; MG/1
1 TABLET ORAL 2 TIMES DAILY
Qty: 20 TABLET | Refills: 0 | Status: SHIPPED | OUTPATIENT
Start: 2020-04-01 | End: 2020-04-11

## 2020-06-29 ENCOUNTER — OFFICE VISIT (OUTPATIENT)
Dept: FAMILY MEDICINE CLINIC | Age: 65
End: 2020-06-29

## 2020-06-29 VITALS
HEIGHT: 62 IN | DIASTOLIC BLOOD PRESSURE: 60 MMHG | TEMPERATURE: 97.7 F | HEART RATE: 68 BPM | WEIGHT: 137.38 LBS | SYSTOLIC BLOOD PRESSURE: 100 MMHG | RESPIRATION RATE: 12 BRPM | BODY MASS INDEX: 25.28 KG/M2

## 2020-06-29 PROCEDURE — G0438 PPPS, INITIAL VISIT: HCPCS | Performed by: FAMILY MEDICINE

## 2020-06-29 PROCEDURE — 99213 OFFICE O/P EST LOW 20 MIN: CPT | Performed by: FAMILY MEDICINE

## 2020-06-29 RX ORDER — LEVALBUTEROL TARTRATE 45 UG/1
1 AEROSOL, METERED ORAL EVERY 4 HOURS PRN
Qty: 3 INHALER | Refills: 3 | Status: SHIPPED | OUTPATIENT
Start: 2020-06-29 | End: 2021-07-09 | Stop reason: ALTCHOICE

## 2020-06-29 ASSESSMENT — PATIENT HEALTH QUESTIONNAIRE - PHQ9
SUM OF ALL RESPONSES TO PHQ QUESTIONS 1-9: 0
SUM OF ALL RESPONSES TO PHQ QUESTIONS 1-9: 0

## 2020-06-29 ASSESSMENT — LIFESTYLE VARIABLES: HOW OFTEN DO YOU HAVE A DRINK CONTAINING ALCOHOL: 0

## 2020-06-29 NOTE — PATIENT INSTRUCTIONS
Personalized Preventive Plan for Patricia Desai - 6/29/2020  Medicare offers a range of preventive health benefits. Some of the tests and screenings are paid in full while other may be subject to a deductible, co-insurance, and/or copay. Some of these benefits include a comprehensive review of your medical history including lifestyle, illnesses that may run in your family, and various assessments and screenings as appropriate. After reviewing your medical record and screening and assessments performed today your provider may have ordered immunizations, labs, imaging, and/or referrals for you. A list of these orders (if applicable) as well as your Preventive Care list are included within your After Visit Summary for your review. Other Preventive Recommendations:    · A preventive eye exam performed by an eye specialist is recommended every 1-2 years to screen for glaucoma; cataracts, macular degeneration, and other eye disorders. · A preventive dental visit is recommended every 6 months. · Try to get at least 150 minutes of exercise per week or 10,000 steps per day on a pedometer . · Order or download the FREE \"Exercise & Physical Activity: Your Everyday Guide\" from [employer]. Call 1-111.922.1567 or search [employer] online. · You need 1365-7986 mg of calcium and 4752-8420 IU of vitamin D per day. It is possible to meet your calcium requirement with diet alone, but a vitamin D supplement is usually necessary to meet this goal.  · When exposed to the sun, use a sunscreen that protects against both UVA and UVB radiation with an SPF of 30 or greater. Reapply every 2 to 3 hours or after sweating, drying off with a towel, or swimming. · Always wear a seat belt when traveling in a car. Always wear a helmet when riding a bicycle or motorcycle.

## 2020-06-29 NOTE — PROGRESS NOTES
mg by mouth 2 times daily. Yes Historical Provider, MD   MILK THISTLE PO Take by mouth 2 times daily  Yes Historical Provider, MD   Coenzyme Q10 (COQ10 PO) Take 100 mg by mouth daily  Yes Historical Provider, MD   Omega-3 Fatty Acids (OMEGA 3 PO) Take by mouth 2 times daily  Yes Historical Provider, MD   Cyanocobalamin (VITAMIN B 12 PO) Take by mouth daily  Yes Historical Provider, MD   multivitamin (THERAGRAN) per tablet Take 1 tablet by mouth daily.    Yes Historical Provider, MD   zoledronic acid (RECLAST) 5 MG/100ML SOLN Infuse 100 mLs intravenously once for 1 dose  Rose Guevara MD       Past Medical History:   Diagnosis Date    Asthma     Essential tremor 2011    Fx. left wrist 5/20/14    GERD (gastroesophageal reflux disease)     Inflammatory polyps of colon ([facility] [address] 12/2013    Osteoarthritis     cervical    Osteoporosis 2013    Tremors of nervous system     Begnin essential tremors       Past Surgical History:   Procedure Laterality Date    APPENDECTOMY      HYSTERECTOMY         Family History   Problem Relation Age of Onset    Cancer Mother         Breast and Skin Cancer    Other Mother         tremor    Heart Disease Mother         Afib    Heart Disease Father     Kidney Disease Father     Other Father         Inability to produce blood    Heart Attack Father     Cancer Father         Colon Cancer and then Prostate Cancer that met to Bone Cancer    Other Maternal Grandmother         tremor    Anxiety Disorder Sister     Depression Sister     Thyroid Disease Sister         Nodules    High Cholesterol Sister        CareTeam (Including outside providers/suppliers regularly involved in providing care):   Patient Care Team:  Rose Guevara MD as PCP - General (Family Medicine)  Rose Guevara MD as PCP - [facility] Empaneled Provider  Cam Mullins MD as Consulting Physician (Neurology)    Wt Readings from Last 3 Encounters:   06/29/20 137 lb 6 oz (62.3 kg)   10/28/19 123 lb Status  Yes Drinks/Week  0 Standard drinks or equivalent per week Amount  0.0 standard drinks of alcohol/wk Comment  occ          Drug Use     Drug Use Status  No          Sexual Activity     Sexually Active  Not Asked               Audit Questionnaire: Screen for Alcohol Misuse  How often do you have a drink containing alcohol?: Never  Substance Abuse Interventions:  · we reviewed stopping smoking    General Health:  General  In general, how would you say your health is?: Very Good  In the past 7 days, have you experienced any of the following? New or Increased Pain, New or Increased Fatigue, Loneliness, Social Isolation, Stress or Anger?: (!) New or Increased Fatigue, Stress  Do you get the social and emotional support that you need?: (!) No  Do you have a Living Will?: Yes  General Health Risk Interventions:  · she has gained weight that make getting about harder   · Caring for the parents    Health Habits/Nutrition:  Health Habits/Nutrition  Do you exercise for at least 20 minutes 2-3 times per week?: (!) No  Have you lost any weight without trying in the past 3 months?: No  Do you eat fewer than 2 meals per day?: No  Have you seen a dentist within the past year?: Yes  Body mass index is 25.13 kg/m².   Health Habits/Nutrition Interventions:  · she is busy and feels too tired    Safety:  Safety  Do you have working smoke detectors?: Yes  Have all throw rugs been removed or fastened?: Yes  Do you have non-slip mats or surfaces in all bathtubs/showers?: Yes  Do all of your stairways have a railing or banister?: (!) No  Are your doorways, halls and stairs free of clutter?: Yes  Do you always fasten your seatbelt when you are in a car?: Yes  Safety Interventions:  · an outdoor stairway needs one    Personalized Preventive Plan   Current Health Maintenance Status  Immunization History   Administered Date(s) Administered    FLUZONE 3 YEARS AND OVER 10/10/2013, 09/18/2014    Influenza Virus Vaccine 11/19/2015    Influenza, MDCK Quadv, IM, PF (Flucelvax 4 yrs and older) 10/23/2017    Influenza, Geo, 6 mo and older, IM (Fluzone, Flulaval) 10/23/2018    Influenza, Quadv, IM, (6 mo and older Fluzone, Flulaval, Fluarix and 3 yrs and older Afluria) 11/16/2016    Influenza, Triv, inactivated, subunit, adjuvanted, IM (Fluad 65 yrs and older) 10/02/2019    Pneumococcal Conjugate 13-valent ([id number]) 10/02/2019    Pneumococcal Polysaccharide ([id number]) 11/19/2015    Tdap (Boostrix, Adacel) 11/16/2016    Zoster Live (Zostavax) 11/22/2013        Health Maintenance   Topic Date Due    Shingles Vaccine (2 of 3) 01/17/2014    Annual Wellness Visit (AWV)  10/08/2019    Pneumococcal 65+ years Vaccine (2 of 2 - PPSV23) 11/19/2020    Breast cancer screen  12/17/2020    Colon cancer screen colonoscopy  12/16/2023    Lipid screen  10/30/2024    DTaP/Tdap/Td vaccine (2 - Td) 11/16/2026    DEXA (modify frequency per FRAX score)  Completed    Flu vaccine  Completed    Hepatitis C screen  Completed    Hepatitis A vaccine  Aged Out    Hepatitis B vaccine  Aged Out    Hib vaccine  Aged Out    Meningococcal (ACWY) vaccine  Aged Out     Recommendations for [employer] Due: see orders and patient instructions/AVS.  . Recommended screening schedule for the next 5-10 years is provided to the patient in written form: see Patient Cheng Cuadra was seen today for medicare awv.     Diagnoses and all orders for this visit:    RAD (reactive airway disease), unspecified asthma severity, uncomplicated

## 2020-07-09 ENCOUNTER — OFFICE VISIT (OUTPATIENT)
Dept: NEUROLOGY | Age: 65
End: 2020-07-09

## 2020-07-09 VITALS
HEIGHT: 62 IN | HEART RATE: 97 BPM | BODY MASS INDEX: 25.25 KG/M2 | WEIGHT: 137.2 LBS | OXYGEN SATURATION: 98 % | DIASTOLIC BLOOD PRESSURE: 82 MMHG | SYSTOLIC BLOOD PRESSURE: 120 MMHG

## 2020-07-09 PROCEDURE — 99213 OFFICE O/P EST LOW 20 MIN: CPT | Performed by: PSYCHIATRY & NEUROLOGY

## 2020-07-09 NOTE — PROGRESS NOTES
, Rfl:     Omega-3 Fatty Acids (OMEGA 3 PO), Take by mouth 2 times daily , Disp: , Rfl:     Cyanocobalamin (VITAMIN B 12 PO), Take by mouth daily , Disp: , Rfl:     multivitamin (THERAGRAN) per tablet, Take 1 tablet by mouth daily. , Disp: , Rfl:     azithromycin (ZITHROMAX) 250 MG tablet, Take 2 tabs (500 mg) on Day 1, and take 1 tab (250 mg) on days 2 through 5. (Patient not taking: Reported on 7/9/2020), Disp: 1 packet, Rfl: 0    zoledronic acid (RECLAST) 5 MG/100ML SOLN, Infuse 100 mLs intravenously once for 1 dose, Disp: 100 mL, Rfl: 0      PE:   Vitals:    07/09/20 1212   BP: 120/82   Site: Left Upper Arm   Position: Sitting   Cuff Size: Small Adult   Pulse: 97   SpO2: 98%   Weight: 137 lb 3.2 oz (62.2 kg)   Height: 5' 2\" (1.575 m)     General Appearance:  awake, alert, oriented, in no acute distress  Gen: NAD, Language is Intact. Head: no rash, no icterus  Neck: There is no carotid bruits. The Neck is supple. Neuro: CN 2-12 grossly intact with no focal deficits. Power 5/5 Throughout symmetric, Reflexes are symmetric. Long tracts are intact. Cerebellar exam is Intact. Sensory exam is intact to light touch. Gait is intact. She has bilateral symmetric action tremor in her bilateral upper extremities. She has mild head tremor. She has voice tremor  Musculoskeletal:  Has no hand arthritis, no limitation of ROM in any of the four extremities.   Lower extremities no edema        DATA:  Results for orders placed or performed in visit on 10/28/19   Lipid Panel w/ Reflex Direct LDL   Result Value Ref Range    Cholesterol 211 (H) 150 - 200 mg/dL    Triglycerides 69 27 - 150 mg/dL    HDL 72 >39 mg/dL    LDL Calculated 125 <130 mg/dL    VLDL Cholesterol Calculated 14 0 - 30 mg/dL    Chol/HDL Ratio 2.9 1.0 - 5.0   Comprehensive Metabolic Panel   Result Value Ref Range    Glucose 83 65 - 99 mg/dL    BUN 17 5 - 27 mg/dL    CREATININE 0.74 0.40 - 1.00 mg/dL    eGFR African American >60 >59 ml/min/1.73sq.m    EGFR IF NonAfrican American >60 >59 ml/min/1.73sq. m    Calcium 9.2 8.5 - 10.5 mg/dL    Sodium 143 134 - 146 mmol/L    Potassium 4.6 3.5 - 5.0 mmol/L    Chloride 105 98 - 109 mmol/L    CO2 29 22 - 32 mmol/L    Anion Gap 9 4 - 12 mmol/L    Total Bilirubin 0.5 0.3 - 1.2 mg/dL    Alk Phosphatase 67 39 - 130 U/L    AST 20 0 - 41 U/L    ALT 16 0 - 31 U/L    Total Protein 6.4 6.0 - 8.0 g/dL    Alb 4.1 3.2 - 5.3 g/dL   Vitamin D 25 Hydroxy   Result Value Ref Range    Vit D, 25-Hydroxy 65 SEE BELOW NG/ML          Results for orders placed in visit on 12   MRI Cervical Spine WO Contrast       Results for orders placed in visit on 16   MRI Brain WO Contrast    Narrative Ordering Provider: [address] [place] [address]        Radiology Department Patient:  [facility]  1955   [place]: El[place], [place] [facility] [place]  [place] [address]  Unit #:   [id number]     Acct #:  [de-identified]     Ordering Phys: Kiesha Jenkins MD         Exam Date: 16    Accession #:  [id number]    Exam:  MRI   MRI Brain Without Contrast    Result:             STUDY:   MRI BRAIN WITHOUT CONTRAST          REASON FOR EXAM:   Female, 57years old. Action tremors          TECHNIQUE:   Standardized multiplanar fat and water weighted pulse     sequences were obtained. COMPARISON:   December 3, 2012     ___________________________________          FINDINGS:     There is moderate atrophy and mild periventricular white matter ischemic     changes without mass effect or restricted diffusion. .          Normal bilateral basal ganglia. Normal thalami. There is no extra-axial     fluid accumulation. Normal flow voids within the major intracranial circulation suggesting     patency by spin echo criteria. Normal sella turcica, pituitary gland, infundibular stalk, optic chiasm     and hypothalamus. Normal tectal plate and pineal gland.           Normal midbrain, kati and medulla. Normal cerebellum. Normal basal     cisterns. Normal bilateral temporal bones. Normal bilateral internal     auditory canals. Fluid signal noted within the mastoids which may be consistent with     inflammatory changes. No demonstrated orbital abnormality, within the constraints of a routine     brain study. There is mild mucosal thickening within the right sphenoid     and bilateral ethmoid sinuses. .  Normal calvarium and skull base. Normal     visualized soft tissue structures. Normal visualized upper cervical     spine. No significant change since prior exam ___________________________________          IMPRESSION:     Moderate atrophy and mild periventricular white matter ischemic changes     without evidence for acute infarct. Electronically Signed:     Concepcion Jane MD     2016/12/05 at 16:55 EST     Tel [phone], Service support [phone], Fax [phone]                      cc: Jigar Cho MD; Joanna Manriquez MD            Dictated by:  Rogelio Mendiola MD on 12/05/16 0737    Technologist:  Sonido Bundy RT(R)(MR)    Transcribed by:  Rogelio Mendiola on 12/05/16 7221        Report Signed by:  Jae DOTSON on 12/05/16 6867                     Assessment:     Diagnosis Orders   1. Essential tremor            She is here for follow up for  Essential tremor, she has increased action and voice tremor. She feels CBD oil works and she takes that on her own. She feels tremor is worse and is not interested in medications. After detailed discussion with patient we agreed on the following plan. Plan:  1. Follow up in 12 months. 2. Call if any questions or concerns. Time spent evaluating patient, reviewing records, counseling, was more than 15 min. All patient's questions were answered. Please call if any questions.     Jigar Cho MD

## 2020-08-03 NOTE — TELEPHONE ENCOUNTER
Date of last visit:  6/29/2020  Date of next visit:  10/12/2020    Requested Prescriptions     Pending Prescriptions Disp Refills    tiotropium (SPIRIVA RESPIMAT) 2.5 MCG/ACT AERS inhaler 2 Inhaler 2     Sig: Inhale 2 puffs into the lungs daily

## 2020-09-10 ENCOUNTER — TELEPHONE (OUTPATIENT)
Dept: ENT CLINIC | Age: 65
End: 2020-09-10

## 2020-09-10 NOTE — TELEPHONE ENCOUNTER
Patient called the [address] [facility] requesting that she be scheduled for a thyroid ultrasound. I informed her that no ultrasound was ordered for her to have done after 10/08/19.

## 2020-09-11 ENCOUNTER — OFFICE VISIT (OUTPATIENT)
Dept: ENT CLINIC | Age: 65
End: 2020-09-11
Payer: COMMERCIAL

## 2020-09-11 VITALS
HEART RATE: 76 BPM | BODY MASS INDEX: 25.11 KG/M2 | TEMPERATURE: 98.1 F | RESPIRATION RATE: 16 BRPM | DIASTOLIC BLOOD PRESSURE: 72 MMHG | SYSTOLIC BLOOD PRESSURE: 120 MMHG | WEIGHT: 137.3 LBS

## 2020-09-11 PROCEDURE — 99212 OFFICE O/P EST SF 10 MIN: CPT | Performed by: OTOLARYNGOLOGY

## 2020-09-11 ASSESSMENT — ENCOUNTER SYMPTOMS
VOMITING: 0
NAUSEA: 0
ABDOMINAL PAIN: 0
DIARRHEA: 0
SHORTNESS OF BREATH: 0
CHEST TIGHTNESS: 0
CHOKING: 0
SORE THROAT: 1
RHINORRHEA: 0
STRIDOR: 0
TROUBLE SWALLOWING: 1
VOICE CHANGE: 1
SINUS PRESSURE: 0
FACIAL SWELLING: 0
APNEA: 0
WHEEZING: 0
COLOR CHANGE: 0
COUGH: 0

## 2020-09-11 NOTE — PROGRESS NOTES
[facility] PROFESSIONAL SERVS  [facility] EAR, NOSE AND THROAT  [address] [facility]  Dept: [phone]  Dept Fax: [phone]  Loc: [phone]    Quynh Hall is a 76 y.o. female who was referred byNo ref. provider found for:  Chief Complaint   Patient presents with    Other     Patient here for evaluation of her right throat pain and throat looking bigger on the right side. Patient has family history of thyroid cancer. Sometimes has difficulty swallowing pills. Osie Ra HPI:     Quynh Hall is a 76 y.o. female who presents today for discussion of getting another thyroid ultrasound. She feels like her throat is changed to palpation and is concerned about possible thyroid nodule or thyroid enlargement. Review of her last ultrasound showed multiple subcentimeter nodules on the left side which had not changed for a very long time. We were going to wait a couple years before getting another ultrasound, unless she noticed something. History: Allergies   Allergen Reactions    Latex Hives    Prolia [Denosumab] Other (See Comments)     Hand and feet tingle and throat tightness    Albuterol Palpitations     Makes her very jittery and gives her bad heart palpitations.      Amoxicillin Rash     Current Outpatient Medications   Medication Sig Dispense Refill    tiotropium (SPIRIVA RESPIMAT) 2.5 MCG/ACT AERS inhaler Inhale 2 puffs into the lungs daily 3 Inhaler 3    fluticasone-salmeterol (ADVAIR) 100-50 MCG/DOSE diskus inhaler USE 1 INHALATION TWICE A  each 3    levalbuterol (XOPENEX HFA) 45 MCG/ACT inhaler Inhale 1 puff into the lungs every 4 hours as needed for Wheezing 3 Inhaler 3    raloxifene (EVISTA) 60 MG tablet TAKE 1 TABLET DAILY 90 tablet 4    UNABLE TO FIND Indications: CBD oil, using for tremors Taking 1/2 to full dropper PRN       ZINC PO Take by mouth daily       TURMERIC PO Take by mouth 2 times daily       docusate sodium (COLACE) 100 MG capsule Take 100 mg by mouth 2 times daily as needed       Calcium Citrate-Vitamin D (CALCIUM CITRATE + PO) Take 630 mg by mouth daily. Two daily      Cholecalciferol (VITAMIN D3 PO) Take 5,000 Units by mouth daily       magnesium 30 MG tablet Take 30 mg by mouth daily       MILK THISTLE PO Take by mouth 2 times daily       Coenzyme Q10 (COQ10 PO) Take 100 mg by mouth daily       Omega-3 Fatty Acids (OMEGA 3 PO) Take by mouth 2 times daily       Cyanocobalamin (VITAMIN B 12 PO) Take by mouth daily       multivitamin (THERAGRAN) per tablet Take 1 tablet by mouth daily. No current facility-administered medications for this visit.       Past Medical History:   Diagnosis Date    Asthma     Essential tremor 2011    Fx. left wrist 5/20/14    GERD (gastroesophageal reflux disease)     Inflammatory polyps of colon ([facility] [address] 12/2013    Osteoarthritis     cervical    Osteoporosis 2013    Tremors of nervous system     Begnin essential tremors      Past Surgical History:   Procedure Laterality Date    APPENDECTOMY      HYSTERECTOMY      TOOTH EXTRACTION Right      Family History   Problem Relation Age of Onset    Cancer Mother         Breast and Skin Cancer    Other Mother         tremor    Heart Disease Mother         Afib    Heart Disease Father     Kidney Disease Father     Other Father         Inability to produce blood    Heart Attack Father     Cancer Father         Colon Cancer and then Prostate Cancer that met to Bone Cancer    Other Maternal Grandmother         tremor    Anxiety Disorder Sister     Depression Sister     Thyroid Disease Sister         Nodules    High Cholesterol Sister      Social History     Tobacco Use    Smoking status: Current Every Day Smoker     Packs/day: 0.75     Years: 2.00     Pack years: 1.50     Types: Cigarettes     Start date: 11/1/2018    Smokeless tobacco: Never Used   Substance Use Topics    Alcohol use: Not Currently     Alcohol/week: 0.0 standard drinks       Subjective:      Review of Systems   Constitutional: Negative for activity change, appetite change, chills, diaphoresis, fatigue, fever and unexpected weight change. HENT: Positive for sore throat, trouble swallowing and voice change. Negative for congestion, dental problem, ear discharge, ear pain, facial swelling, hearing loss, mouth sores, nosebleeds, postnasal drip, rhinorrhea, sinus pressure, sneezing and tinnitus. Eyes: Negative for visual disturbance. Respiratory: Negative for apnea, cough, choking, chest tightness, shortness of breath, wheezing and stridor. Cardiovascular: Negative for chest pain, palpitations and leg swelling. Gastrointestinal: Negative for abdominal pain, diarrhea, nausea and vomiting. Endocrine: Negative for cold intolerance, heat intolerance, polydipsia and polyuria. Genitourinary: Negative for dysuria, enuresis and hematuria. Musculoskeletal: Negative for arthralgias, gait problem, neck pain and neck stiffness. Skin: Negative for color change and rash. Allergic/Immunologic: Negative for environmental allergies, food allergies and immunocompromised state. Neurological: Negative for dizziness, syncope, facial asymmetry, speech difficulty, light-headedness and headaches. Hematological: Negative for adenopathy. Does not bruise/bleed easily. Psychiatric/Behavioral: Negative for confusion and sleep disturbance. The patient is not nervous/anxious. Objective:   /72 (Site: Right Upper Arm, Position: Sitting)   Pulse 76   Temp 98.1 °F (36.7 °C) (Infrared)   Resp 16   Wt 137 lb 4.8 oz (62.3 kg)   BMI 25.11 kg/m²     Physical Exam   Neck: There is slight fullness to the thyroid gland. I do not feel any discrete hard nodule    Data:  All of the past medical history, past surgical history, family history,social history, allergies and current medications were reviewed with the patient.      Assessment & Plan   Diagnoses and all

## 2020-09-17 ENCOUNTER — HOSPITAL ENCOUNTER (OUTPATIENT)
Dept: ULTRASOUND IMAGING | Age: 65
Discharge: HOME | End: 2020-09-17
Payer: MEDICARE

## 2020-09-17 PROCEDURE — 76536 US EXAM OF HEAD AND NECK: CPT

## 2020-10-02 ENCOUNTER — OFFICE VISIT (OUTPATIENT)
Dept: ENT CLINIC | Age: 65
End: 2020-10-02
Payer: MEDICARE

## 2020-10-02 VITALS
TEMPERATURE: 97.7 F | BODY MASS INDEX: 24.75 KG/M2 | SYSTOLIC BLOOD PRESSURE: 126 MMHG | DIASTOLIC BLOOD PRESSURE: 72 MMHG | WEIGHT: 135.3 LBS | RESPIRATION RATE: 16 BRPM | HEART RATE: 72 BPM

## 2020-10-02 PROCEDURE — 99212 OFFICE O/P EST SF 10 MIN: CPT | Performed by: OTOLARYNGOLOGY

## 2020-10-02 ASSESSMENT — ENCOUNTER SYMPTOMS
SORE THROAT: 0
CHEST TIGHTNESS: 0
CHOKING: 0
APNEA: 0
VOMITING: 0
WHEEZING: 0
TROUBLE SWALLOWING: 0
SHORTNESS OF BREATH: 0
COUGH: 0
DIARRHEA: 0
RHINORRHEA: 0
ABDOMINAL PAIN: 0
COLOR CHANGE: 0
NAUSEA: 0
SINUS PRESSURE: 0
VOICE CHANGE: 0
FACIAL SWELLING: 0
STRIDOR: 0

## 2020-10-02 NOTE — PROGRESS NOTES
[facility]  [address] [facility]  Dept: [phone]  Dept Fax: [phone]  Loc: [phone]    Chata Hobbs is a 76 y.o. female who was referred byNo ref. provider found for:  Chief Complaint   Patient presents with    Results     Patient here for results of her Thyroid US. Xiomara Abreu HPI:     Chata Hobbs is a 76 y.o. female who presents today for 3 week follow up thyroid ultrasound. Results reviewed with patient. Thyroid Ultrasound:          Heterogeneous thyroid with increased Doppler flow could relate to thyroiditis.         Left-sided nodules are favored to be stable. Based on the American Thyroid Association criteria, this has a low suspicion for malignancy.                   **This report has been created using voice recognition software.  It may contain minor errors which are inherent in voice recognition technology. **         Final report electronically signed by Dr. Kristyn Stack on 9/18/2020   Left side nodules appear to be stable. She is tired at times. Every 2-3 weeks she has to sleep but other than that she is very active. She sleeps 6-7 hours. She does not feel a change. She is not taking synthroid. History: Allergies   Allergen Reactions    Latex Hives    Prolia [Denosumab] Other (See Comments)     Hand and feet tingle and throat tightness    Albuterol Palpitations     Makes her very jittery and gives her bad heart palpitations.      Amoxicillin Rash     Current Outpatient Medications   Medication Sig Dispense Refill    tiotropium (SPIRIVA RESPIMAT) 2.5 MCG/ACT AERS inhaler Inhale 2 puffs into the lungs daily 3 Inhaler 3    fluticasone-salmeterol (ADVAIR) 100-50 MCG/DOSE diskus inhaler USE 1 INHALATION TWICE A  each 3    levalbuterol (XOPENEX HFA) 45 MCG/ACT inhaler Inhale 1 puff into the lungs every 4 hours as needed for Wheezing 3 Inhaler 3    raloxifene Use    Smoking status: Current Every Day Smoker     Packs/day: 0.75     Years: 2.00     Pack years: 1.50     Types: Cigarettes     Start date: 11/1/2018    Smokeless tobacco: Never Used   Substance Use Topics    Alcohol use: Not Currently     Alcohol/week: 0.0 standard drinks       Subjective:       Review of Systems   Constitutional: Negative for activity change, appetite change, chills, diaphoresis, fatigue, fever and unexpected weight change. HENT: Negative for congestion, dental problem, ear discharge, ear pain, facial swelling, hearing loss, mouth sores, nosebleeds, postnasal drip, rhinorrhea, sinus pressure, sneezing, sore throat, tinnitus, trouble swallowing and voice change. Eyes: Negative for visual disturbance. Respiratory: Negative for apnea, cough, choking, chest tightness, shortness of breath, wheezing and stridor. Cardiovascular: Negative for chest pain, palpitations and leg swelling. Gastrointestinal: Negative for abdominal pain, diarrhea, nausea and vomiting. Endocrine: Negative for cold intolerance, heat intolerance, polydipsia and polyuria. Genitourinary: Negative for dysuria, enuresis and hematuria. Musculoskeletal: Negative for arthralgias, gait problem, neck pain and neck stiffness. Skin: Negative for color change and rash. Allergic/Immunologic: Negative for environmental allergies, food allergies and immunocompromised state. Neurological: Negative for dizziness, syncope, facial asymmetry, speech difficulty, light-headedness and headaches. Hematological: Negative for adenopathy. Does not bruise/bleed easily. Psychiatric/Behavioral: Negative for confusion and sleep disturbance. The patient is not nervous/anxious.         Objective:     /72 (Site: Right Upper Arm, Position: Sitting)   Pulse 72   Temp 97.7 °F (36.5 °C) (Infrared)   Resp 16   Wt 135 lb 4.8 oz (61.4 kg)   BMI 24.75 kg/m²     Physical Exam    Data:  All of the past medical history, past surgical history, family history,social history, allergies and current medications were reviewed with the patient. Assessment & Plan   Diagnoses and all orders for this visit:     Diagnosis Orders   1. Multiple thyroid nodules     2. Multinodular goiter         The findings were explained and her questions were answered. Options were discussed including having pcp order thyroid ultrasound in 2 years. This could be through her PCP or us. She would need to call back to schedule this. She agreed. I, Trino Barron CMA ([facility]), am scribing for, and in the presence of Dr. Raymond Purvis. Electronically signed by Keshav Springer CMA ([facility]) on 10/2/20 at 2:13 PM EDT. (Please note that portions of this note were completed with a voice recognition program. Efforts were made to edit the dictations butoccasionally words are mis-transcribed.)    I agree to the above documentation placed by my scribe. I have personally evaluated this patient. Additional findings are as noted. I reviewed the scribe's note and agree with the documented findings and plan of care. Any areas of disagreement are corrected. I agree with the chief complaint, past medical history, past surgical history, allergies, medications, social and family history as documented unless otherwise noted below.      Electronically signed by Monica Jimenez MD on 10/12/2020 at 7:33 PM

## 2020-10-26 ENCOUNTER — OFFICE VISIT (OUTPATIENT)
Dept: FAMILY MEDICINE CLINIC | Age: 65
End: 2020-10-26

## 2020-10-26 VITALS
HEART RATE: 60 BPM | TEMPERATURE: 96.4 F | SYSTOLIC BLOOD PRESSURE: 130 MMHG | WEIGHT: 134.5 LBS | DIASTOLIC BLOOD PRESSURE: 82 MMHG | RESPIRATION RATE: 14 BRPM | BODY MASS INDEX: 24.75 KG/M2 | HEIGHT: 62 IN

## 2020-10-26 PROCEDURE — 99214 OFFICE O/P EST MOD 30 MIN: CPT | Performed by: FAMILY MEDICINE

## 2020-10-26 ASSESSMENT — ENCOUNTER SYMPTOMS
CONSTIPATION: 0
COUGH: 1
SHORTNESS OF BREATH: 1
SINUS PRESSURE: 0

## 2020-10-26 NOTE — PROGRESS NOTES
Subjective:      Patient ID: Citlaly Larsen is a 76 y.o. female. HPI  1. We discussed the visit with Dr Ailyn Alberts  2. She needs some labs done  Review of Systems   Constitutional: Positive for fatigue. HENT: Negative for sinus pressure. Eyes: Negative for visual disturbance. Respiratory: Positive for cough and shortness of breath. Cardiovascular: Negative for chest pain. Gastrointestinal: Negative for constipation. Genitourinary: Negative. Musculoskeletal: Positive for arthralgias. Skin: Negative for rash. Neurological: Positive for weakness. Negative for headaches. The patient's medications, allergies, past medical problems, surgical, social, and family histories were reviewed and updated as needed. Objective:   Physical Exam  Constitutional:       General: She is not in acute distress. Appearance: She is well-developed. HENT:      Head: Normocephalic and atraumatic. Right Ear: External ear normal.      Left Ear: External ear normal.      Nose: Nose normal.      Mouth/Throat:      Pharynx: No oropharyngeal exudate. Eyes:      General: No scleral icterus. Conjunctiva/sclera: Conjunctivae normal.   Neck:      Musculoskeletal: Neck supple. Thyroid: No thyromegaly. Vascular: No carotid bruit. Trachea: No tracheal deviation. Cardiovascular:      Rate and Rhythm: Normal rate and regular rhythm. Heart sounds: Normal heart sounds. Pulmonary:      Effort: Pulmonary effort is normal.      Breath sounds: Normal breath sounds. Abdominal:      General: Bowel sounds are normal.      Palpations: Abdomen is soft. There is no mass. Lymphadenopathy:      Cervical: No cervical adenopathy. Skin:     General: Skin is warm and dry. Neurological:      Mental Status: She is alert and oriented to person, place, and time.    Psychiatric:         Behavior: Behavior normal.     Blood pressure 130/82, pulse 60, temperature 96.4 °F (35.8 °C), temperature source Infrared, resp. rate 14, height 5' 2\" (1.575 m), weight 134 lb 8 oz (61 kg), not currently breastfeeding. Assessment:       Diagnosis Orders   1. Multinodular thyroid  TSH    T4, Free   2. Vitamin D deficiency  Vitamin D 25 Hydroxy   3. Pure hypercholesterolemia  Lipid, Fasting    Comprehensive Metabolic Panel, Fasting   4.  Vitamin B12 deficiency  Vitamin B12             Plan:      Do the fasting labs soon  Keep working at not smoking  See me in 6 months        Chrissy Gauthier MD

## 2020-11-02 LAB
ALBUMIN SERPL-MCNC: 3.9 G/DL (ref 3.2–5.3)
ALK PHOSPHATASE: 61 U/L (ref 39–130)
ALT SERPL-CCNC: 16 U/L (ref 0–31)
ANION GAP SERPL CALCULATED.3IONS-SCNC: 7 MMOL/L (ref 5–15)
AST SERPL-CCNC: 23 U/L (ref 0–41)
BILIRUB SERPL-MCNC: 0.6 MG/DL (ref 0.3–1.2)
BUN BLDV-MCNC: 15 MG/DL (ref 5–27)
CALCIUM SERPL-MCNC: 8.9 MG/DL (ref 8.5–10.5)
CHLORIDE BLD-SCNC: 105 MMOL/L (ref 98–109)
CHOLESTEROL/HDL RATIO: 3.3 (ref 1–5)
CHOLESTEROL: 197 MG/DL (ref 150–200)
CO2: 29 MMOL/L (ref 22–32)
CREAT SERPL-MCNC: 0.81 MG/DL (ref 0.4–1)
EGFR AFRICAN AMERICAN: >60 ML/MIN/1.73SQ.M
EGFR IF NONAFRICAN AMERICAN: >60 ML/MIN/1.73SQ.M
GLUCOSE: 89 MG/DL (ref 65–99)
HDLC SERPL-MCNC: 60 MG/DL
LDL CHOLESTEROL CALCULATED: 123 MG/DL
LDL/HDL RATIO: 2.1
POTASSIUM SERPL-SCNC: 4.8 MMOL/L (ref 3.5–5)
SODIUM BLD-SCNC: 141 MMOL/L (ref 134–146)
T4 FREE: 0.83 NG/DL (ref 0.61–1.6)
TOTAL PROTEIN: 6.5 G/DL (ref 6–8)
TRIGL SERPL-MCNC: 68 MG/DL (ref 27–150)
TSH SERPL DL<=0.05 MIU/L-ACNC: 1.84 UIU/ML (ref 0.49–4.67)
VITAMIN B-12: >1500 PG/ML (ref 180–914)
VITAMIN D 25-HYDROXY: >120 NG/ML (ref 30–100)
VLDLC SERPL CALC-MCNC: 14 MG/DL (ref 0–30)

## 2020-11-05 ENCOUNTER — TELEPHONE (OUTPATIENT)
Dept: FAMILY MEDICINE CLINIC | Age: 65
End: 2020-11-05

## 2020-11-05 RX ORDER — VARENICLINE TARTRATE 1 MG/1
1 TABLET, FILM COATED ORAL 2 TIMES DAILY
Qty: 60 TABLET | Refills: 3 | Status: SHIPPED | OUTPATIENT
Start: 2020-11-05 | End: 2021-07-09 | Stop reason: ALTCHOICE

## 2020-11-05 RX ORDER — VARENICLINE TARTRATE
1 KIT SEE ADMIN INSTRUCTIONS
Qty: 1 BOX | Refills: 0 | Status: SHIPPED | OUTPATIENT
Start: 2020-11-05 | End: 2021-07-09 | Stop reason: ALTCHOICE

## 2020-11-10 ENCOUNTER — TELEPHONE (OUTPATIENT)
Dept: FAMILY MEDICINE CLINIC | Age: 65
End: 2020-11-10

## 2020-11-10 NOTE — TELEPHONE ENCOUNTER
----- Message from Cortez Prasad MD sent at 11/9/2020  8:10 PM EST -----  Let her know the vit D and vit B12 levels were too high. She should stop those supplements. The LDL could be better. Check the fasting LDL and vit levels in early May.

## 2020-11-20 ENCOUNTER — TELEPHONE (OUTPATIENT)
Dept: FAMILY MEDICINE CLINIC | Age: 65
End: 2020-11-20

## 2020-11-20 NOTE — TELEPHONE ENCOUNTER
Pt would like to have a referral to Dr Chris Lora for COPD and asthma. Rosemary Res that she has spoken to you about seeing a lung doctor.

## 2020-11-23 NOTE — TELEPHONE ENCOUNTER
Yoel Lin. It looks like it's been some time since the last CXR and PFT I will order those at [facility]

## 2020-11-24 ENCOUNTER — TELEPHONE (OUTPATIENT)
Dept: FAMILY MEDICINE CLINIC | Age: 65
End: 2020-11-24

## 2020-11-24 NOTE — TELEPHONE ENCOUNTER
pft scheduled for 11/27/2020 at 1pm appt at [facility]. Patient to arrive at 1245pm no inhalers for 4hrs. No caffeine. MOM to return call to office faxed orders to [facility]. Can do cxr after appt.

## 2020-11-30 ENCOUNTER — TELEPHONE (OUTPATIENT)
Dept: FAMILY MEDICINE CLINIC | Age: 65
End: 2020-11-30

## 2020-11-30 NOTE — TELEPHONE ENCOUNTER
----- Message from Cameron Molina MD sent at 11/29/2020 11:05 PM EST -----  Let her know the cxr was similar to last year's.

## 2021-04-09 DIAGNOSIS — M81.0 AGE RELATED OSTEOPOROSIS, UNSPECIFIED PATHOLOGICAL FRACTURE PRESENCE: ICD-10-CM

## 2021-04-09 RX ORDER — RALOXIFENE HYDROCHLORIDE 60 MG/1
TABLET, FILM COATED ORAL
Qty: 90 TABLET | Refills: 3 | Status: SHIPPED | OUTPATIENT
Start: 2021-04-09 | End: 2021-11-16

## 2021-04-27 ENCOUNTER — OFFICE VISIT (OUTPATIENT)
Dept: FAMILY MEDICINE CLINIC | Age: 66
End: 2021-04-27

## 2021-04-27 VITALS
HEART RATE: 64 BPM | RESPIRATION RATE: 16 BRPM | HEIGHT: 62 IN | BODY MASS INDEX: 25.28 KG/M2 | TEMPERATURE: 97.3 F | DIASTOLIC BLOOD PRESSURE: 70 MMHG | SYSTOLIC BLOOD PRESSURE: 118 MMHG | WEIGHT: 137.38 LBS

## 2021-04-27 DIAGNOSIS — J45.909 RAD (REACTIVE AIRWAY DISEASE), UNSPECIFIED ASTHMA SEVERITY, UNCOMPLICATED: Primary | ICD-10-CM

## 2021-04-27 PROBLEM — F45.8 BRUXISM: Status: ACTIVE | Noted: 2021-04-27

## 2021-04-27 PROBLEM — M17.12 OSTEOARTHRITIS OF LEFT PATELLOFEMORAL JOINT: Status: ACTIVE | Noted: 2021-04-27

## 2021-04-27 PROBLEM — J34.9 DISORDER OF PARANASAL SINUS: Status: ACTIVE | Noted: 2021-04-27

## 2021-04-27 PROBLEM — F17.200 CURRENT SMOKER: Status: ACTIVE | Noted: 2021-04-27

## 2021-04-27 PROBLEM — R40.0 DAYTIME SOMNOLENCE: Status: ACTIVE | Noted: 2021-04-27

## 2021-04-27 PROCEDURE — 99213 OFFICE O/P EST LOW 20 MIN: CPT | Performed by: FAMILY MEDICINE

## 2021-04-27 RX ORDER — ALBUTEROL SULFATE 90 UG/1
2 AEROSOL, METERED RESPIRATORY (INHALATION) 4 TIMES DAILY
COMMUNITY

## 2021-04-27 ASSESSMENT — PATIENT HEALTH QUESTIONNAIRE - PHQ9
SUM OF ALL RESPONSES TO PHQ QUESTIONS 1-9: 0
1. LITTLE INTEREST OR PLEASURE IN DOING THINGS: 0
SUM OF ALL RESPONSES TO PHQ QUESTIONS 1-9: 0

## 2021-04-27 ASSESSMENT — ENCOUNTER SYMPTOMS
SHORTNESS OF BREATH: 1
CONSTIPATION: 0
SINUS PRESSURE: 0

## 2021-04-27 NOTE — PROGRESS NOTES
Jessie Vela (:  1955) is a 78 y.o. female,Established patient, here for evaluation of the following chief complaint(s):  No chief complaint on file. ASSESSMENT/PLAN:   Diagnosis Orders   1. RAD (reactive airway disease), unspecified asthma severity, uncomplicated         See me in 6 months    SUBJECTIVE/OBJECTIVE:  HPI  1. She states her neck is uncomfortable on the right and she states she may make an appointment with ENT  2. She has quit smoking  3. Her pulmonologist stopped the advar and said she has RAD  4. She feels tried often  Review of Systems   Constitutional: Positive for fatigue. HENT: Negative for sinus pressure. Eyes: Negative for visual disturbance. Respiratory: Positive for shortness of breath. Cardiovascular: Negative for chest pain. Gastrointestinal: Negative for constipation. Genitourinary: Negative. Musculoskeletal: Positive for arthralgias and myalgias. Skin: Negative for rash. Neurological: Positive for weakness. Negative for headaches. The patient's medications, allergies, past medical problems, surgical, social, and family histories were reviewed and updated as needed. Physical Exam  Constitutional:       General: She is not in acute distress. Appearance: She is well-developed. HENT:      Head: Normocephalic and atraumatic. Eyes:      General: No scleral icterus. Conjunctiva/sclera: Conjunctivae normal.   Neck:      Trachea: No tracheal deviation. Cardiovascular:      Rate and Rhythm: Normal rate and regular rhythm. Heart sounds: Normal heart sounds. Pulmonary:      Effort: Pulmonary effort is normal.      Breath sounds: Normal breath sounds. Skin:     General: Skin is warm and dry. Neurological:      Mental Status: She is alert and oriented to person, place, and time. Psychiatric:         Behavior: Behavior normal.     Blood pressure 118/70, pulse 64, temperature 97.3 °F (36.3 °C), temperature source Skin, resp.  rate

## 2021-05-10 DIAGNOSIS — E53.8 VITAMIN B12 DEFICIENCY: Primary | ICD-10-CM

## 2021-05-10 DIAGNOSIS — E55.9 VITAMIN D DEFICIENCY: ICD-10-CM

## 2021-05-10 LAB
LDL CHOLESTEROL DIRECT: 137 MG/DL
VITAMIN B-12: 511 PG/ML (ref 180–914)
VITAMIN D 25-HYDROXY: 86.2 NG/ML (ref 30–100)

## 2021-05-10 RX ORDER — LANOLIN ALCOHOL/MO/W.PET/CERES
1000 CREAM (GRAM) TOPICAL EVERY OTHER DAY
COMMUNITY
Start: 2021-05-10 | End: 2021-11-16

## 2021-05-15 PROBLEM — J47.9 BRONCHIECTASIS (HCC): Status: ACTIVE | Noted: 2021-01-01

## 2021-06-07 ENCOUNTER — TELEPHONE (OUTPATIENT)
Dept: FAMILY MEDICINE CLINIC | Age: 66
End: 2021-06-07

## 2021-06-07 DIAGNOSIS — M54.2 NECK PAIN ON RIGHT SIDE: Primary | ICD-10-CM

## 2021-06-07 NOTE — TELEPHONE ENCOUNTER
Pt called requesting a referral to [address] [place] [address] ENT for right neck/throat area bothering her also back behind right ear. Said that she discussed this with you at the last appt and she was going to make her own appt however they are wanting a referral done.     Pt can be reached at [phone]

## 2021-07-09 ENCOUNTER — OFFICE VISIT (OUTPATIENT)
Dept: NEUROLOGY | Age: 66
End: 2021-07-09
Payer: MEDICARE

## 2021-07-09 VITALS
HEART RATE: 104 BPM | BODY MASS INDEX: 24.66 KG/M2 | SYSTOLIC BLOOD PRESSURE: 138 MMHG | DIASTOLIC BLOOD PRESSURE: 80 MMHG | HEIGHT: 62 IN | OXYGEN SATURATION: 98 % | WEIGHT: 134 LBS

## 2021-07-09 DIAGNOSIS — G25.0 ESSENTIAL TREMOR: Primary | ICD-10-CM

## 2021-07-09 DIAGNOSIS — R49.8 VOICE TREMOR: ICD-10-CM

## 2021-07-09 PROCEDURE — 99213 OFFICE O/P EST LOW 20 MIN: CPT | Performed by: PSYCHIATRY & NEUROLOGY

## 2021-07-09 RX ORDER — PREDNISOLONE ACETATE 10 MG/ML
SUSPENSION/ DROPS OPHTHALMIC
COMMUNITY
Start: 2021-07-08 | End: 2022-05-11

## 2021-07-09 RX ORDER — OFLOXACIN 3 MG/ML
SOLUTION/ DROPS OPHTHALMIC
COMMUNITY
Start: 2021-07-08 | End: 2021-11-16

## 2021-07-09 NOTE — PATIENT INSTRUCTIONS
1. Referral to speech therapy re voice tremor. 2. Follow up in 12 months. 3. Call if any questions or concerns.

## 2021-07-16 ENCOUNTER — HOSPITAL ENCOUNTER (OUTPATIENT)
Dept: SPEECH THERAPY | Age: 66
Setting detail: THERAPIES SERIES
Discharge: HOME | End: 2021-07-16
Payer: MEDICARE

## 2021-07-16 PROCEDURE — 92524 BEHAVRAL QUALIT ANALYS VOICE: CPT | Performed by: SPEECH-LANGUAGE PATHOLOGIST

## 2021-07-16 NOTE — PROGRESS NOTES
** PLEASE SIGN, DATE AND TIME CERTIFICATION BELOW AND RETURN TO [facility] OUTPATIENT REHABILITATION (FAX #: [phone]). ATTEST/CO-SIGN IF ACCESSING VIA INappbackr. THANK YOU.**    I certify that I have examined the patient below and determined that Physical Medicine and Rehabilitation service is necessary and that I approve the established plan of care for up to 90 days or as specifically noted. Attestation, signature or co-signature of physician indicates approval of certification requirements.    ________________________ ____________ __________  Physician Signature   Date   Time    [address] IN:  2216  TIME OUT:  1520  UNTIMED TREATMENT:  62  TIMED TREATMENT: 0  TOTAL TIME: 62 minutes       Date: 2021  Patient Name: Jannie Phillips       CSN: [id number]    : 1955  (77 y.o.)  Gender: female   Referring Physician:  Dr. Malou Christianson  Diagnosis: Voice tremor (R49.8)  Secondary Diagnosis:  dysphonia  Insurance/Certification Information: [employer]  Visit number / total approved visits: 1 - no visit limit  Visit count since last progress note:  1  Certification Date: 3/95/48  Diet:  Regular with thin liquids    History of Present Illness/Injury: Patient reports she noticed her handwriting was getting worse in . Patient then went to see Dr. Nanci Cuevas, Neurologist, in the end of . Patient was diagnosed with benign essential tremor. Shortly after, patient switched to Dr. Mo Wallace.  Patient reports she has tried multiple medications for treatment, but none of them have helped her symptoms. Patient reports Dr. Mo Wallace noted some changes in her voice in , but it has progressively gotten worse with more notable tremor. Patient reports her voice sounds better in the morning, but is worse when she is more tired.   Patient also reports being very busy helping care for multiple other people, which likely causes her stress. Patient admits to having a dry mouth for which she uses lozenges and sips on water or tea frequently. Patient reports she drinks 1 cup of coffee per day. Patient reports she will intermittently have pain and some swelling on the right side of her neck. Please see medical history questionnaire for information related to prior medical history, allergies and medications  Previous Tx:No  Asthma:Yes - uses a daily inhaler  GERD: Yes - diagnosed \"years ago\", but denies any issues currently and she does not take medication for it     Subjective:  Patient cooperative and pleasant. Pain: 0/10    ORAL MOTOR EXAMINATION:  WFL    BREATHING:   At Rest: Thoracic  During Speech: Diaphragmatic/Abdominal    BREATH SUPPORT FOR SPEECH: Inadequate  Maximum Phonation Time: /i/: 6.0 Seconds, . 95% RAP    VELOPHARYNGEAL CLOSURE/ADEQUACY:  Adequate    VOICE PRODUCTION DURING CONNECTED SPEECH AND VOCAL TASKS:    NASAL RESONANCE: Normal    PITCH: Normal - patient reports her pitch sounds lower than typical  Fundamental Frequency of Phonation:       223 Hz  Musical Fundamental Frequency range: Gliding up the scale:136 - 288 Hz                                                                           Gliding down the scale: 386 - 120 Hz    VOCAL INFLECTION:Normal    ORAL RESONANCE: Normal    INTENSITY / LOUDNESS: Normal      Avg dB  /a/           84 dB   Count 1-10           79 dB   [place]           76 dB   Conversation           76 dB     VOCAL QUALITY:    Breathy    Dysphonic Moderate   Harsh    Hoarse    Strained/Strangled Mild   Strident    Tremor/spasms Moderate     LARYNGEAL TENSION: Hyperfunction noted perceptually    SUSTAINED VOICED - VOICELESS PRODUCTION:  S/Z Ratio: 3.8      Trial 1 Trial 2 Trial 3   /s/ 6.1 6.9 7.6   /z/ .2 .7 2.0     RATE OF SPEECH: normal    IDENTIFIED VOCAL ABUSES / MISUSES: Prolonged Talking at times, Yelling, coughing, throat clearing, caffeine (1 coffee per day and caffeinated/watered-down tea throughout the day, reduced hydration (16 ounces per day), ex-smoker    DYSARTHRIA:No    STIMULABILITY FOR IMPROVED VOICE: Yes - with cues to relax her throat, patient was able to complete sustained phonation more smooth for the first 1.5 seconds of the production    CLINICAL IMPRESSIONS: Patient presents with mild to moderate dysphonia characterized by a shaky, spasm, or tremor-like vocal quality that causes voice breaks, which may be consistent with spasmodic dysphonia. Patient also presents with at least mild hyperfunction during structured speech tasks as well as conversational speech. Patient reports having a very busy lifestyle caring for several other people, which likely causes her stress and thus laryngeal tension. REHAB POTENTIAL:  Very Good     EDUCATION:  Learner: Patient  Education: Reviewed results and recommendations of this evaluation and Recommendation for ENT consultation (already scheduled for 7/19/21) and videostroboscopy to further assess vocal fold movement. Evaluation of Education: Verbalizes understanding, Needs further instruction and Family not present    PLAN / TREATMENT RECOMMENDATIONS: Otolaryngolic (ENT) Evaluation (already scheduled with Dr. Mariela Emerson on 7/19/21) and Videostroboscopy. SHORT TERM GOALS:  Short-term Goals  Timeframe for Short-term Goals: 6 weeks  Goal 1: Complete a videostroboscopy to further assess vocal fold movement. Add goals as indicated. LONG TERM GOALS:  Long-term Goals  Timeframe for Long-term Goals: 6 weeks  Goal 1: Complete a videostroboscopy to further assess vocal fold movement. Add goals as indicated.         Trace Rudd M.S. [id number] [id number]

## 2021-07-19 ENCOUNTER — OFFICE VISIT (OUTPATIENT)
Dept: ENT CLINIC | Age: 66
End: 2021-07-19
Payer: MEDICARE

## 2021-07-19 VITALS
TEMPERATURE: 96.6 F | WEIGHT: 131.7 LBS | OXYGEN SATURATION: 94 % | DIASTOLIC BLOOD PRESSURE: 74 MMHG | SYSTOLIC BLOOD PRESSURE: 122 MMHG | HEART RATE: 86 BPM | BODY MASS INDEX: 24.09 KG/M2 | RESPIRATION RATE: 18 BRPM

## 2021-07-19 DIAGNOSIS — R49.0 DYSPHONIA OF ESSENTIAL TREMOR: Primary | ICD-10-CM

## 2021-07-19 DIAGNOSIS — E04.2 MULTIPLE THYROID NODULES: ICD-10-CM

## 2021-07-19 DIAGNOSIS — R25.1 DYSPHONIA OF ESSENTIAL TREMOR: Primary | ICD-10-CM

## 2021-07-19 DIAGNOSIS — G25.0 ESSENTIAL TREMOR: ICD-10-CM

## 2021-07-19 DIAGNOSIS — Z87.898 HISTORY OF NECK SWELLING: ICD-10-CM

## 2021-07-19 PROCEDURE — 99214 OFFICE O/P EST MOD 30 MIN: CPT | Performed by: NURSE PRACTITIONER

## 2021-07-19 RX ORDER — ERYTHROMYCIN 5 MG/G
OINTMENT OPHTHALMIC
COMMUNITY
Start: 2021-07-09 | End: 2021-11-16

## 2021-07-19 NOTE — PROGRESS NOTES
than usual.  She denies any odynphagia or dysphagia. Patient also reports right anterior neck swelling that in intermittently occurring 1-2 times per month for the last 2 years. She describes this as more of a nonspecific \"puffy\" edema of the right anterior neck. This does not occur after eating and there are no other precipitating factors that she can identify. She will experience brief sudden sharp pain up the right side of the neck and postauricular area that occurs intermittently and is seemingly unprovoked. The swelling and pain do not correlate timing wise. She denies any reflux symptoms. She does have trouble with bruxism for many years, for which she wears a night dental guard. She is a former smoker, but quit in December 2020. History: Allergies   Allergen Reactions    Latex Hives    Prolia [Denosumab] Other (See Comments)     Hand and feet tingle and throat tightness    Amoxicillin Rash     Current Outpatient Medications   Medication Sig Dispense Refill    ofloxacin (OCUFLOX) 0.3 % solution       prednisoLONE acetate (PRED FORTE) 1 % ophthalmic suspension       vitamin B-12 (CYANOCOBALAMIN) 1000 MCG tablet Take 1 tablet by mouth every other day      albuterol sulfate HFA (VENTOLIN HFA) 108 (90 Base) MCG/ACT inhaler Inhale 2 puffs into the lungs 4 times daily      raloxifene (EVISTA) 60 MG tablet TAKE 1 TABLET DAILY 90 tablet 3    UNABLE TO FIND Indications: CBD oil, using for tremors Taking 1/2 to full dropper PRN       ZINC PO Take by mouth daily       TURMERIC PO Take by mouth 2 times daily       docusate sodium (COLACE) 100 MG capsule Take 100 mg by mouth 2 times daily as needed       Calcium Citrate-Vitamin D (CALCIUM CITRATE + PO) Take 630 mg by mouth daily.  Two daily      magnesium 30 MG tablet Take 30 mg by mouth daily       MILK THISTLE PO Take by mouth 2 times daily       Coenzyme Q10 (COQ10 PO) Take 100 mg by mouth daily       Omega-3 Fatty Acids (OMEGA 3 PO) Take by mouth 2 times daily       multivitamin (THERAGRAN) per tablet Take 1 tablet by mouth daily.  erythromycin (ROMYCIN) 5 MG/GM ophthalmic ointment APPLY ONE FOURTH INCH RIBBON TO EYELID MARGINS AT BEDTIME ONCE A WEEK       No current facility-administered medications for this visit. Past Medical History:   Diagnosis Date    Asthma     Bronchiectasis ([facility] 2021    Essential tremor 2011    Fx. left wrist 2014    GERD (gastroesophageal reflux disease)     Inflammatory polyps of colon ([facility] 2013    Osteoarthritis     cervical    Osteoporosis 2013    Tremors of nervous system 2012    Begnin essential tremors      Past Surgical History:   Procedure Laterality Date    APPENDECTOMY      HYSTERECTOMY      TOOTH EXTRACTION Right      Family History   Problem Relation Age of Onset    Cancer Mother         Breast and Skin Cancer    Other Mother         tremor    Heart Disease Mother         Afib    Heart Disease Father     Kidney Disease Father     Other Father         Inability to produce blood    Heart Attack Father     Cancer Father         Colon Cancer and then Prostate Cancer that met to Bone Cancer    Other Maternal Grandmother         tremor    Anxiety Disorder Sister     Depression Sister     Thyroid Disease Sister         Nodules    High Cholesterol Sister      Social History     Tobacco Use    Smoking status: Former Smoker     Packs/day: 0.75     Years: 2.00     Pack years: 1.50     Types: Cigarettes     Start date: 2018     Quit date: 2021     Years since quittin.5    Smokeless tobacco: Never Used   Substance Use Topics    Alcohol use: Not Currently     Alcohol/week: 0.0 standard drinks        Subjective:      Review of Systems  Rest of review of systems are negative, except as noted in HPI.      Objective:     /74 (Site: Right Upper Arm, Position: Sitting)   Pulse 86   Temp 96.6 °F (35.9 °C) (Infrared)   Resp 18   Wt 131 lb 11.2 oz (59.7 kg)   SpO2 94%   BMI 24.09 kg/m²     PHYSICAL EXAM  Constitutional: Oriented to person, place, and time. appears well-developed and well-nourished. No distress. Voice: Moderate dysphonia with pitch break and vocal tremor evident. While having patient count 1-10 at normal volume, tremor of strap muscles and diaphragm is noted. Palpation of anterior neck with vocalization also reveals tremor of hyoid and mylohyoid. Exam of oropharynx with vocalization reveals palatal tremor. No tremor of SCM. With lower volume (loud whispered) voice, there is notable decrease in pitch break and tremor. The converse is true with loud vocalization. HENT:   Head: Normocephalic and atraumatic. Right Ear:  External ear normal. Tympanic membrane intact. Middle ear aerated. Left Ear:  External ear normal. Tympanic membrane intact. Middle ear aerated. Nose:  External nose normal. Nasal mucosa normal. No lesions noted. Mouth/Throat:  Fair dentition. Oral cavity mucosa normal, no masses or lesions noted. Oropharynx is clear and moist.   Eyes:  Pupils are equal, round, and reactive to light. Conjunctivae and EOM are normal.   Neck:  Normal range of motion. Neck supple. No JVD present. No tracheal deviation present. No thyromegaly present. No cervical lymphadenopathy noted. Cardiovascular:  Normal rate. Pulmonary/Chest:  Effort normal. No stridor or stertor. No respiratory distress. Musculoskeletal:  Normal range of motion. No edema or lymphadenopathy. Neurological:  Alert and oriented to person, place, and time. Cranial nerve II-XII grossly intact. Fasciculations technically absent by definition, but mild tremor of tongue present on protrusion. Tremor of upper lip evident at rest.  Skin:  Skin is warm. No erythema. Psychiatric:  Normal mood and affect. Behavior is normal.     Vitals reviewed.     Data:  All of the past medical history, past surgical history, family history,social history, allergies and current medications were reviewed with the patient. Assessment & Plan   Diagnoses and all orders for this visit:     Diagnosis Orders   1. Dysphonia of essential tremor  SLP videostroboscopy   2. Essential tremor     3. History of neck swelling     4. Multiple thyroid nodules         The findings were explained and her questions were answered. Her moderate dysphonia with vocal tremor appears to be directly related to the benign essential tremors. It is likely that she is unknowingly squeezing the laryngeal structures, or causing hyperfunction, which in turn worsens her vocal performance. Interestingly, her smoking history may actually cause the vocal cords to be thicker or chronically more edematous preventing more variation in her voice pitch than is seen with a nonsmoking patient with thin cords. Recommend videostroboscopy to further evaluate vocal cord function and continued speech therapy treatment. There is no concerning clinical exam finding today related to the intermittent right neck swelling. Suggest that patient take a picture from the front and side of the neck next time it swells so that we have a better idea of what she is experiencing. It is possible that the edema is secondary to muscle strain from tremor. Patient will return after videostroboscopy for review. Plan of care was discussed with patient in detail along with Dr Ravi Russ and all questions were answered to her understanding. Return for results review. **This report has been created using voice recognition software. It may contain minor errors which are inherent in voice recognition technology. **

## 2021-07-20 PROBLEM — R49.0 DYSPHONIA OF ESSENTIAL TREMOR: Status: ACTIVE | Noted: 2021-07-20

## 2021-07-20 PROBLEM — E04.2 MULTIPLE THYROID NODULES: Status: ACTIVE | Noted: 2021-07-20

## 2021-07-20 PROBLEM — R25.1 DYSPHONIA OF ESSENTIAL TREMOR: Status: ACTIVE | Noted: 2021-07-20

## 2021-08-05 ENCOUNTER — HOSPITAL ENCOUNTER (OUTPATIENT)
Dept: SPEECH THERAPY | Age: 66
Setting detail: THERAPIES SERIES
Discharge: HOME | End: 2021-08-05
Payer: MEDICARE

## 2021-08-05 PROCEDURE — 31579 LARYNGOSCOPY TELESCOPIC: CPT | Performed by: SPEECH-LANGUAGE PATHOLOGIST

## 2021-08-05 NOTE — PROGRESS NOTES
** PLEASE SIGN, DATE AND TIME CERTIFICATION BELOW AND RETURN TO [facility] OUTPATIENT REHABILITATION (FAX #: [phone]). ATTEST/CO-SIGN IF ACCESSING VIA [employer]. THANK YOU.**    I certify that I have examined the patient below and determined that Physical Medicine and Rehabilitation service is necessary and that I approve the established plan of care for up to 90 days or as specifically noted. Attestation, signature or co-signature of physician indicates approval of certification requirements.    ________________________ ____________ __________  Physician Signature   Date   Time    Rajesh[place] IN: 6244  TIME OUT: 3283  UNTIMED TREATMENT: 29  TIMED TREATMENT: 0  TOTAL TIME: 29 minutes          Date: 2021  Patient Name: Rodrick Rodriguez      CSN: [id number]   : 1955  (77 y.o.)  Gender: female   Referring Physician:  MARA Galvan  Diagnosis: Dysphonia of essential tremor (R25.1, R49.0)  Secondary Diagnosis:  dysphonia  Insurance/Certification Information:[employer] Medicare  Visit number / total approved visits: 1 - unlimited visits based on medical necessity  Visit count since last progress note:  1  Certification Date: n/a  Diet:  Regular with thin liquids  History of Present Illness: Patient reports she noticed her handwriting was getting worse in . Patient then went to see Dr. Guillermo Cardona, Neurologist, in the end of . Patient was diagnosed with benign essential tremor. Shortly after, patient switched to Dr. Renea Desai.  Patient reports she has tried multiple medications for treatment, but none of them have helped her symptoms. Patient reports Dr. Renea Desai noted some changes in her voice in , but it has progressively gotten worse with more notable tremor. Patient reports her voice sounds better in the morning, but is worse when she is more tired.   Patient also reports being very busy helping care for multiple other people, which likely causes her stress. Patient admits to having a dry mouth for which she uses lozenges and sips on water or tea frequently. Patient reports she drinks 1 cup of coffee per day. Patient reports she will intermittently have pain and some swelling on the right side of her neck. Patient reports Dr. Carolan Bence discussed doing possible botox injections, however, recommended to complete the videostroboscopy first to see what it shows. Patient  has a past medical history of Asthma, Bronchiectasis ([address] Essential tremor, Fx. left wrist, GERD (gastroesophageal reflux disease), Inflammatory polyps of colon ([address] Osteoarthritis, Osteoporosis, and Tremors of nervous system. Subjective: Patient cooperative and pleasant. No family present. Patient Primary Complaint: \"It is just the quality of it (voice), the shakiness of it and how it breaks up. \"  Pain: 0/10    SURGICAL HISTORY:    Past Surgical History:   Procedure Laterality Date    APPENDECTOMY        HYSTERECTOMY        TOOTH EXTRACTION Right               Vocal Quality:  Breathy    Dysphonic Moderate   Harsh    Hoarse Mild   Strained/Strangled Mild   Strident    Tremor-like quality Moderate       STROBOSCOPIC EVALUATION:  Procedure performed by: YUNIOR Walter  Assistant: Denny Roque RN  Video reviewed in collaboration with Dr. Carolan Bence  Scope: Rigid Scope - yellow  Topical Anesthetic Used: No  Patient Positioning: Chair/90 degrees    Procedure explained and education provided to patient including purpose, benefits and risks of testing. Understanding and agreement with testing was verbalized. A rigid scope was passed orally along the superior lingual plane. Laryngeal imaging was obtained without difficulty.     Patient tolerance of procedure: No complications or complaints observed or reported    LARYNGEAL/VOCAL FOLD IMAGING:     Glottic Closure: Complete Supraglottic/Ventricular Fold Activity (Hyperfunction):  Lateral-Medial Compression:  Not Present  Anterior-Posterior Compression: Mild   Vocal Fold Margin   Left: Smooth/WFL Right: Smooth/WFL   Vocal Fold Mobility   Left:Normal Right: Normal   Amplitude   Left: Mildly Decreased Right: Mild to mdoerately Decreased   Mucosal Wave   Left:Mildly Decreased Right: Mildly Decreased   Phase Closure: Normal   Phase Symmetry: Asymmetrical   Overall Laryngeal Function: Mild A-P hyperfunction     Additional Comments from VLS: Patient with noted whitish areas on the superior surface of the true vocal folds bilaterally. This appears at the junction of the anterior 1/3 and posterior 2/3 of the right true vocal fold and the middle of the left true vocal fold. A gelatinous-like material frequently collects in these 2 areas. The patient is able to clear the material with a volitional cough, however, the whitish areas are still noted after clearing. The interarytenoid space also appears whitish in color with a rough look to it. Mildly increased vascularization noted in the true vocal folds. Mild erythema of bilateral arytenoid cartilages. CLINICAL IMPRESSIONS: Patient presents with moderate dysphonia characterized by a hoarse and tremor-like vocal quality with a perceptual strain. Mild A-P hyperfunction noted at times. Per Dr. Napoleon Pride, patient with a severe pharyngoesophageal tremor. RECOMMENDATIONS:  1. Follow up with ENT as scheduled to review results of VLS. 2.  Dr. Napoleon Pride does not feel voice therapy would be beneficial in improving the patient's voice due to the severe tremor. EDUCATION:  Education Provided:  Patient educated on the anatomy and physiology of the larynx with use of the video. Patient educated that the video will be reviewed with Dr. Napoleon Pride to determine a plan.   Learner:patient  Method:AV materials and discussion  Evaluation:Verbalized understanding    PATIENT GOALS:  Improve her voice        Shoshana Ramirez M.S. Ashish Olivier [id number]

## 2021-08-10 ENCOUNTER — APPOINTMENT (OUTPATIENT)
Dept: SPEECH THERAPY | Age: 66
End: 2021-08-10
Payer: MEDICARE

## 2021-08-31 ENCOUNTER — OFFICE VISIT (OUTPATIENT)
Dept: ENT CLINIC | Age: 66
End: 2021-08-31
Payer: MEDICARE

## 2021-08-31 VITALS
HEART RATE: 102 BPM | SYSTOLIC BLOOD PRESSURE: 122 MMHG | OXYGEN SATURATION: 95 % | DIASTOLIC BLOOD PRESSURE: 78 MMHG | RESPIRATION RATE: 14 BRPM | WEIGHT: 131 LBS | BODY MASS INDEX: 23.96 KG/M2 | TEMPERATURE: 97.3 F

## 2021-08-31 DIAGNOSIS — G25.0 ESSENTIAL TREMOR: ICD-10-CM

## 2021-08-31 DIAGNOSIS — R25.1 DYSPHONIA OF ESSENTIAL TREMOR: Primary | ICD-10-CM

## 2021-08-31 DIAGNOSIS — R49.0 DYSPHONIA OF ESSENTIAL TREMOR: Primary | ICD-10-CM

## 2021-08-31 DIAGNOSIS — Z87.898 HISTORY OF NECK SWELLING: ICD-10-CM

## 2021-08-31 DIAGNOSIS — E04.2 MULTIPLE THYROID NODULES: ICD-10-CM

## 2021-08-31 PROCEDURE — 99213 OFFICE O/P EST LOW 20 MIN: CPT | Performed by: PHYSICIAN ASSISTANT

## 2021-08-31 NOTE — PROGRESS NOTES
[facility] PHYSICIANS [place] [facility]  [facility] EAR, NOSE AND THROAT  [facility]  Dept: [phone]  Dept Fax: [phone]  Loc: [phone]    Chip Early is a 78 y.o. female who was referred by No ref. provider found for:  Chief Complaint   Patient presents with    Follow-up     Patient is here for f/u after videostrobe   . HPI:     Patient presents for follow up after videostroboscopy. She reports that her voice remains shaky, but stable. She states that her voice does seem to worsen as the day goes on and she talks more. She denies any shortness of breath. She did experience a flare up of her right neck swelling. She states that the area of edema typically extends from inferior to the right ear and down the right lateral neck. She was experiencing some midline neck discomfort. Usually the discomfort lasts for a few minutes and spontaneously resolves. Typically she experiences the swelling without pain however. She doesn't experience any shortness of breath with this. She denies any other symptoms or concerns at this time. Subjective:      REVIEW OF SYSTEMS:    A complete multi-organ review of systems was performed using a new patient questionnaire, and reviewed by me.   ENT:  negative except as noted in HPI  CONSTITUTIONAL:  negative except as noted in HPI  EYES:  negative except as noted in HPI  RESPIRATORY:  negative except as noted in HPI  CARDIOVASCULAR:  negative except as noted in HPI  GASTROINTESTINAL:  negative except as noted in HPI  GENITOURINARY:  negative except as noted in HPI  MUSCULOSKELETAL:  negative except as noted in HPI  SKIN:  negative except as noted in HPI  ENDOCRINE/METABOLIC: negative except as noted in HPI  HEMATOLOGIC/LYMPHATIC:  negative except as noted in HPI  ALLERGY/IMMUN: negative except as noted in HPI  NEUROLOGICAL:  negative except as noted in HPI  BEHAVIOR/PSYCH:  negative except as noted in HPI    Past Medical History:  Past Medical History:   Diagnosis Date    Asthma     Bronchiectasis ([facility] 01/2021    Essential tremor 01/01/2011    Fx. left wrist 05/20/2014    GERD (gastroesophageal reflux disease)     Inflammatory polyps of colon ([facility] 12/01/2013    Osteoarthritis     cervical    Osteoporosis 01/01/2013    Tremors of nervous system 11/01/2012    [place] essential tremors       Social History:    TOBACCO:   reports that she quit smoking about 7 months ago. Her smoking use included cigarettes. She started smoking about 2 years ago. She has a 1.50 pack-year smoking history. She has never used smokeless tobacco.  ETOH:   reports previous alcohol use. DRUGS:   reports no history of drug use. Family History:       Problem Relation Age of Onset    Cancer Mother         Breast and Skin Cancer    Other Mother         tremor    Heart Disease Mother         Afib    Heart Disease Father     Kidney Disease Father     Other Father         Inability to produce blood    Heart Attack Father     Cancer Father         Colon Cancer and then Prostate Cancer that met to Bone Cancer    Other Maternal Grandmother         tremor    Anxiety Disorder Sister     Depression Sister     Thyroid Disease Sister         Nodules    High Cholesterol Sister        Surgical History:  Past Surgical History:   Procedure Laterality Date    APPENDECTOMY      CATARACT REMOVAL Bilateral 08/2021    HYSTERECTOMY      TOOTH EXTRACTION Right         Objective: This is a 78 y.o. female. Patient is alert and oriented to person, place and time. Patient appears well developed, well nourished. Mood is happy with normal affect. Not obviously hearing impaired. Voice is shaky and tremulous on exam.     /78 (Site: Right Upper Arm, Position: Sitting)   Pulse 102   Temp 97.3 °F (36.3 °C) (Infrared)   Resp 14   Wt 131 lb (59.4 kg)   SpO2 95%   BMI 23.96 kg/m²     Head:   Normocephalic, atraumatic.   No obvious masses or lesions noted.    Mouth/Throat:  Lips, tongue and oral cavity: Normal. No masses or lesions noted   Oral mucosa: moist  Tonsils: present, not acutely enlarged and no erythema or exudates  Oropharynx: normal-appearing mucosa  Hard and soft palates: symmetrical and intact. Salivary glands: not enlarged and no tenderness to palpation. Uvula: midline, no obvious lesions   Gag reflex is present. Neck: Trachea midline. No palpable masses or edema of the neck is noted on exam. Patient provides a picture of the neck swelling from a few weeks ago. It does not appear obviously edematous from limited view with one picture. Lymphatic: No cervical lymphadenopathy noted. Eyes: ELVA, EOM intact. Conjunctiva moist without discharge. Lungs: Normal effort of breathing, not obviously distressed. Neuro: Cranial nerves II-XII grossly intact. Extremities: No clubbing, edema, or cyanosis noted. Data:    SLP evaluation and impression 8/5/21-    Additional Comments from VLS: Patient with noted whitish areas on the superior surface of the true vocal folds bilaterally. This appears at the junction of the anterior 1/3 and posterior 2/3 of the right true vocal fold and the middle of the left true vocal fold. A gelatinous-like material frequently collects in these 2 areas. The patient is able to clear the material with a volitional cough, however, the whitish areas are still noted after clearing. The interarytenoid space also appears whitish in color with a rough look to it. Mildly increased vascularization noted in the true vocal folds. Mild erythema of bilateral arytenoid cartilages.      CLINICAL IMPRESSIONS: Patient presents with moderate dysphonia characterized by a hoarse and tremor-like vocal quality with a perceptual strain. Mild A-P hyperfunction noted at times. Per Dr. Napoleon Pride, patient with a severe pharyngoesophageal tremor.       US thyroid 9/17/20-  FINDINGS:        Right Thyroid - 4.1 x 1.4 x 1.4 cm   Left Thyroid -3.5 x 1.3 x 1.4 cm   Isthmus -0.13 cm       THYROID SIZE: Normal.       NODULES:        No focal nodule on the right.       L1: Superior. Well-circumscribed with a hypoechoic halo and isoechoic center. There is mild Doppler flow. 15 mm x 13 mm x 7 mm similar to previous. Based on the American Thyroid Association criteria, this has a low suspicion for malignancy.       L2: Inferior. Fairly well-circumscribed. Mildly hypoechoic. No Doppler flow. 6 mm by 7 mm by 6 mm. Similar to previous. Based on the American Thyroid Association criteria, this has a low suspicion for malignancy.       L3: Inferior and anterior. Fairly well-circumscribed. Mildly hypoechoic. There are multiple punctate echogenic foci. It is unclear whether these relate to inspissated colloid or microcalcifications. The former is favored. 4 mm x 3 mm x 3 mm. Based on the    American Thyroid Association criteria, this has a low suspicion for malignancy.           PARENCHYMAL ECHOGENICITY/VASCULARITY: Moderate right and mild left heterogeneous parenchyma. Moderately increased Doppler flow bilaterally.       MICROLITHIASIS: None.       CERVICAL LYMPHADENOPATHY:    There are no pathologically enlarged lymph nodes adjacent to the thyroid gland.               Impression       Heterogeneous thyroid with increased Doppler flow could relate to thyroiditis.       Left-sided nodules are favored to be stable. Based on the American Thyroid Association criteria, this has a low suspicion for malignancy. Assessment/Plan:     Diagnosis Orders   1. Dysphonia of essential tremor     2. Essential tremor     3. Multiple thyroid nodules  US THYROID   4. History of neck swelling         The patient is a 78 y.o. female that presents for follow up of her videostroboscopy. Dr Napoleon Pride and I reviewed the results with the patient.  We did not recommend any speech therapy or Botox injection as did not feel this would be beneficial. We did recommend possible referral to [facility] or other tertiary center for possible deep brain stimulation to treat the tremor. The patient reports that she is not too bother by her tremor and voice, but wanted to make sure there was not something concerning going on. She states she will monitor her voice and if symptoms worsen, she will likely consider the referral. Order placed for thyroid US to be completed in about a year (October 2020). She will follow up for the results of the [address], but contact sooner with further voice concerns. She expresses understanding of the plan and thanked me. Patient's exam and treatment plan were in conjunction with Dr. Shawn Singh.      (Please note that portions of this note may have been completed with a voice recognition program.  Efforts were made to edit the dictation but occasionally words are mis-transcribed.)    Electronically signed by GALE Tamayo on 8/31/2021 at 4:04 PM

## 2021-09-09 NOTE — DISCHARGE SUMMARY
[facility] THERAPY QUICK DISCHARGE NOTE  OUTPATIENT  [facility]    Name: Pascual Gaucher  YOB: 1955  Gender: female  Patient Name: Pascual Gaucher        CSN: [id number]   Referring Physician:  MARA Roberts  Diagnosis:  Dysphonia of essential tremor (R25.1, R49.0)  Secondary Diagnosis: dysphonia    Patient is discharged from Speech Therapy services at this time. See last note for details related to results of therapy and goal achievement. Reason for discharge: Patient was seen for a videostroboscopy on 8/5/21 and it was determined that speech therapy would not benefit the patient, therefore, the patient is being discharged at this time.       Wagner Arreola M.S. Milvia Claire 1831

## 2021-10-14 NOTE — PROGRESS NOTES
NPO after midnight except for sip of water with heart/BP meds  Follow instructions given by surgeon including medications to hold   Bring insurance card and photo ID  Shower morning of surgery with liquid antibacterial soap  Wear loose comfortable clothing  Remove jewelry and do not bring valuables  Bring list of medications with dosages and how often taken if not reviewed with PAT    needed at discharge at [facility] 24years old  Call PAT at [phone] for questions

## 2021-10-14 NOTE — PROGRESS NOTES
NPO after midnight except for sip of water with heart/BP meds  Follow instructions given by surgeon including medications to hold   Bring insurance card and photo ID  Shower morning of surgery with liquid antibacterial soap  Wear loose comfortable clothing  Remove jewelry and do not bring valuables  Bring list of medications with dosages and how often taken if not reviewed with PAT    needed at discharge at [facility] 24years old  Call PAT at [phone] for questions

## 2021-10-22 ENCOUNTER — ANESTHESIA (OUTPATIENT)
Dept: OPERATING ROOM | Age: 66
End: 2021-10-22
Payer: MEDICARE

## 2021-10-22 ENCOUNTER — ANESTHESIA EVENT (OUTPATIENT)
Dept: OPERATING ROOM | Age: 66
End: 2021-10-22
Payer: MEDICARE

## 2021-10-22 ENCOUNTER — HOSPITAL ENCOUNTER (OUTPATIENT)
Age: 66
Setting detail: OUTPATIENT SURGERY
Discharge: HOME | End: 2021-10-22
Attending: SPECIALIST | Admitting: SPECIALIST
Payer: MEDICARE

## 2021-10-22 VITALS
TEMPERATURE: 97.2 F | RESPIRATION RATE: 18 BRPM | OXYGEN SATURATION: 97 % | BODY MASS INDEX: 25.03 KG/M2 | WEIGHT: 136 LBS | DIASTOLIC BLOOD PRESSURE: 82 MMHG | HEIGHT: 62 IN | HEART RATE: 71 BPM | SYSTOLIC BLOOD PRESSURE: 144 MMHG

## 2021-10-22 VITALS
OXYGEN SATURATION: 95 % | SYSTOLIC BLOOD PRESSURE: 100 MMHG | TEMPERATURE: 96.8 F | RESPIRATION RATE: 14 BRPM | DIASTOLIC BLOOD PRESSURE: 57 MMHG

## 2021-10-22 PROCEDURE — 6370000000 HC RX 637 (ALT 250 FOR IP): Performed by: SPECIALIST

## 2021-10-22 PROCEDURE — 3700000000 HC ANESTHESIA ATTENDED CARE: Performed by: SPECIALIST

## 2021-10-22 PROCEDURE — 3600000012 HC SURGERY LEVEL 2 ADDTL 15MIN: Performed by: SPECIALIST

## 2021-10-22 PROCEDURE — 2580000003 HC RX 258: Performed by: SPECIALIST

## 2021-10-22 PROCEDURE — 7100000010 HC PHASE II RECOVERY - FIRST 15 MIN: Performed by: SPECIALIST

## 2021-10-22 PROCEDURE — 2500000003 HC RX 250 WO HCPCS: Performed by: SPECIALIST

## 2021-10-22 PROCEDURE — 3600000002 HC SURGERY LEVEL 2 BASE: Performed by: SPECIALIST

## 2021-10-22 PROCEDURE — 6360000002 HC RX W HCPCS: Performed by: SPECIALIST

## 2021-10-22 PROCEDURE — 6360000002 HC RX W HCPCS: Performed by: NURSE ANESTHETIST, CERTIFIED REGISTERED

## 2021-10-22 PROCEDURE — 2500000003 HC RX 250 WO HCPCS: Performed by: NURSE ANESTHETIST, CERTIFIED REGISTERED

## 2021-10-22 PROCEDURE — 3700000001 HC ADD 15 MINUTES (ANESTHESIA): Performed by: SPECIALIST

## 2021-10-22 PROCEDURE — 7100000011 HC PHASE II RECOVERY - ADDTL 15 MIN: Performed by: SPECIALIST

## 2021-10-22 PROCEDURE — 2709999900 HC NON-CHARGEABLE SUPPLY: Performed by: SPECIALIST

## 2021-10-22 RX ORDER — PROPOFOL 10 MG/ML
INJECTION, EMULSION INTRAVENOUS PRN
Status: DISCONTINUED | OUTPATIENT
Start: 2021-10-22 | End: 2021-10-22 | Stop reason: SDUPTHER

## 2021-10-22 RX ORDER — LIDOCAINE HYDROCHLORIDE 20 MG/ML
INJECTION, SOLUTION EPIDURAL; INFILTRATION; INTRACAUDAL; PERINEURAL PRN
Status: DISCONTINUED | OUTPATIENT
Start: 2021-10-22 | End: 2021-10-22 | Stop reason: SDUPTHER

## 2021-10-22 RX ORDER — FENTANYL CITRATE 50 UG/ML
INJECTION, SOLUTION INTRAMUSCULAR; INTRAVENOUS PRN
Status: DISCONTINUED | OUTPATIENT
Start: 2021-10-22 | End: 2021-10-22 | Stop reason: SDUPTHER

## 2021-10-22 RX ORDER — SODIUM CHLORIDE 9 MG/ML
INJECTION, SOLUTION INTRAVENOUS CONTINUOUS
Status: DISCONTINUED | OUTPATIENT
Start: 2021-10-22 | End: 2021-10-22 | Stop reason: HOSPADM

## 2021-10-22 RX ORDER — CEFAZOLIN SODIUM 2 G/100ML
2000 INJECTION, SOLUTION INTRAVENOUS
Status: COMPLETED | OUTPATIENT
Start: 2021-10-22 | End: 2021-10-22

## 2021-10-22 RX ORDER — ONDANSETRON 2 MG/ML
INJECTION INTRAMUSCULAR; INTRAVENOUS PRN
Status: DISCONTINUED | OUTPATIENT
Start: 2021-10-22 | End: 2021-10-22 | Stop reason: SDUPTHER

## 2021-10-22 RX ORDER — LIDOCAINE HYDROCHLORIDE AND EPINEPHRINE 10; 10 MG/ML; UG/ML
INJECTION, SOLUTION INFILTRATION; PERINEURAL PRN
Status: DISCONTINUED | OUTPATIENT
Start: 2021-10-22 | End: 2021-10-22 | Stop reason: ALTCHOICE

## 2021-10-22 RX ORDER — ERYTHROMYCIN 5 MG/G
OINTMENT OPHTHALMIC PRN
Status: DISCONTINUED | OUTPATIENT
Start: 2021-10-22 | End: 2021-10-22 | Stop reason: ALTCHOICE

## 2021-10-22 RX ADMIN — FENTANYL CITRATE 50 MCG: 50 INJECTION, SOLUTION INTRAMUSCULAR; INTRAVENOUS at 11:40

## 2021-10-22 RX ADMIN — PHENYLEPHRINE HYDROCHLORIDE 100 MCG: 10 INJECTION INTRAVENOUS at 11:59

## 2021-10-22 RX ADMIN — PROPOFOL 20 MG: 10 INJECTION, EMULSION INTRAVENOUS at 11:44

## 2021-10-22 RX ADMIN — PROPOFOL 30 MG: 10 INJECTION, EMULSION INTRAVENOUS at 11:42

## 2021-10-22 RX ADMIN — CEFAZOLIN SODIUM 2000 MG: 2 INJECTION, SOLUTION INTRAVENOUS at 11:50

## 2021-10-22 RX ADMIN — SODIUM CHLORIDE: 9 INJECTION, SOLUTION INTRAVENOUS at 11:36

## 2021-10-22 RX ADMIN — FENTANYL CITRATE 50 MCG: 50 INJECTION, SOLUTION INTRAMUSCULAR; INTRAVENOUS at 11:43

## 2021-10-22 RX ADMIN — LIDOCAINE HYDROCHLORIDE 40 MG: 20 INJECTION, SOLUTION EPIDURAL; INFILTRATION; INTRACAUDAL; PERINEURAL at 11:42

## 2021-10-22 RX ADMIN — ONDANSETRON HYDROCHLORIDE 4 MG: 4 INJECTION, SOLUTION INTRAMUSCULAR; INTRAVENOUS at 11:53

## 2021-10-22 RX ADMIN — PROPOFOL 70 MCG/KG/MIN: 10 INJECTION, EMULSION INTRAVENOUS at 11:46

## 2021-10-22 RX ADMIN — PROPOFOL 30 MG: 10 INJECTION, EMULSION INTRAVENOUS at 11:48

## 2021-10-22 ASSESSMENT — PAIN - FUNCTIONAL ASSESSMENT: PAIN_FUNCTIONAL_ASSESSMENT: 0-10

## 2021-10-22 ASSESSMENT — PULMONARY FUNCTION TESTS
PIF_VALUE: 0
PIF_VALUE: 1
PIF_VALUE: 0

## 2021-10-22 ASSESSMENT — LIFESTYLE VARIABLES: SMOKING_STATUS: 1

## 2021-10-22 NOTE — OP NOTE
Operative Note    Patient name: Marcos Arizmendi             Medical Record Number: [id number]    Primary Care Physician: Agueda Watson MD     1955    Date of Procedure: 10/22/2021    Pre-operative Diagnosis: 2cm2 defect of right medial lower periorbital (canthal) area s/p MOHS for basal cell carcinoma    Post-operative Diagnosis: Same    Procedure Performed: Repair of right medial lower periorbital (canthal) area 2cm2 defect with an adjacent tissue transfer (6 cm2) (CPT 32440)    Surgeons/Assistants: MD Luda Yee PA-C    Estimated Blood Loss: 5ml     Complications: none immediately appreciated    Procedure: With the patient lying in the supine position and under adequate anesthesia per the anesthesia team, the area was anesthetized with a total of 13 ml of 1% Lidocaine 1:100,000 with epinephrine solution. The area was then prepped and draped in the standard surgical fashion. There was a 2cm2 defect of the medial canthal/medial lower periorbital area, which could not be closed primarily. Therefore, a 6cm2 sum of defect/adjacent tissue transfer (rotation flap) was then designed, back cut, elevated and inset with 4-0 Monocryl suture placed in interrupted buried fashion. The Burrow's triangles were resected and final closure was completed using benzoin/steristrips. The patient tolerated the procedure quite well and remained hemodynamically stable throughout the procedure and was quite comfortable throughout the operative course.     Clinical staging for cancer cases:  Ct  Cn  Cm    Deandre Barron MD  Electronically signed by me on 10/22/2021 at 12:43 PM  Operative Note      Patient: Marcos Arizmendi  YOB: 1955  MRN: [id number]    Date of Procedure: 10/22/2021    Pre-Op Diagnosis: BCC RIGHT MEDIAL LOWER PERIORBITAL    Post-Op Diagnosis: Same       Procedure(s):  MOHS REPAIR BCC RIGHT MEDIAL LOWER PERIORBITAL    Surgeon(s):  Deandre Barron MD    Assistant: Physician Assistant: Antonette Landis PA-C    Anesthesia: Monitor Anesthesia Care    Estimated Blood Loss (mL): Minimal    Complications: None    Specimens:   * No specimens in log *    Implants:  * No implants in log *      Drains: * No LDAs found *    Findings: 2cm2 defect of right medial lower periorbital (canthal) area s/p MOHS for basal cell carcinoma    Detailed Description of Procedure:   Repair of right medial lower periorbital (canthal) area 2cm2 defect with an adjacent tissue transfer (6 cm2) (CPT 91973)      Electronically signed by Jesus Kumar MD on 10/22/2021 at 12:43 PM

## 2021-10-22 NOTE — PROGRESS NOTES
1230- Pt arrived to PACU phase 2, Artur RN at bedside for report. Pt hooked up to monitor, VSS, pt breathing deeply on room air. 1238- Asked Siobhan BEASLEY about wearing glasses, pt just needs a barrier such as gauze. 1256- IV removed, pt getting dressed. 1300- Pt up to bathroom  1305- Pt back to room IV site on hand brusing and small hematoma pressure and ice applied  1309- Pt and  given discharge instructions pt has ointment in pocket given to him to use.    12- Pt discharged walked out to car with this RN

## 2021-10-22 NOTE — H&P
[address] [address]  History and Physical Update    Pt Name: Buck Padilla  MRN: [id number]  YOB: 1955  Date of evaluation: 10/22/2021    I have examined the patient and reviewed the H&P/Consult and there are no changes to the patient or plans.       Luis Carlos Mosqueda MD  Electronically signed 10/22/2021 at 6:55 AM

## 2021-10-22 NOTE — ANESTHESIA POSTPROCEDURE EVALUATION
Department of Anesthesiology  Postprocedure Note    Patient: Barbara Dawn  MRN: [id number]  YOB: 1955  Date of evaluation: 10/22/2021  Time:  12:58 PM     Procedure Summary     Date: 10/22/21 Room / Location: [address] [place] [address] 04 / [facility]    Anesthesia Start: 5896 Anesthesia Stop: 6948    Procedure: MOHS REPAIR BCC RIGHT MEDIAL LOWER PERIORBITAL (Right Face) Diagnosis: ([facility] RIGHT MEDIAL LOWER PERIORBITAL)    Surgeons: Kacie Conner MD Responsible Provider: Henna Herring DO    Anesthesia Type: MAC ASA Status: 3          Anesthesia Type: No value filed. Janett Phase I:      Janett Phase II: Janett Score: 10    Last vitals: Reviewed and per EMR flowsheets.        Anesthesia Post Evaluation    Patient location during evaluation: bedside  Patient participation: complete - patient participated  Level of consciousness: awake and alert  Pain score: 0  Airway patency: patent  Nausea & Vomiting: no nausea and no vomiting  Complications: no  Cardiovascular status: hemodynamically stable and blood pressure returned to baseline  Respiratory status: spontaneous ventilation, room air and acceptable  Hydration status: stable

## 2021-10-22 NOTE — ANESTHESIA PRE PROCEDURE
Department of Anesthesiology  Preprocedure Note       Name:  Whitney Baptiste   Age:  78 y.o.  :  1955                                          MRN:  [id number]         Date:  10/22/2021      Surgeon: Lonna Frankel):  Dwayne Camacho MD    Procedure: Procedure(s):  MOHS REPAIR BCC RIGHT MEDIAL LOWER PERIORBITAL    Medications prior to admission:   Prior to Admission medications    Medication Sig Start Date End Date Taking? Authorizing Provider   albuterol sulfate HFA (VENTOLIN HFA) 108 (90 Base) MCG/ACT inhaler Inhale 2 puffs into the lungs 4 times daily   Yes Historical Provider, MD   raloxifene (EVISTA) 60 MG tablet TAKE 1 TABLET DAILY 21  Yes Marcella Vasquez MD   erythromycin [facility]) 5 MG/GM ophthalmic ointment APPLY ONE FOURTH INCH RIBBON TO EYELID MARGINS AT BEDTIME ONCE A WEEK 21   Historical Provider, MD   ofloxacin (OCUFLOX) 0.3 % solution  21   Historical Provider, MD   prednisoLONE acetate (PRED FORTE) 1 % ophthalmic suspension  21   Historical Provider, MD   vitamin B-12 (CYANOCOBALAMIN) 1000 MCG tablet Take 1 tablet by mouth every other day 5/10/21   Marcella Vasquez MD   UNABLE TO FIND Indications: CBD oil, using for tremors Taking 1/2 to full dropper PRN     Historical Provider, MD   ZINC PO Take by mouth daily     Historical Provider, MD   TURMERIC PO Take by mouth 2 times daily     Historical Provider, MD   docusate sodium (COLACE) 100 MG capsule Take 100 mg by mouth 2 times daily as needed     Historical Provider, MD   Calcium Citrate-Vitamin D (CALCIUM CITRATE + PO) Take 630 mg by mouth daily.  Two daily    Historical Provider, MD   magnesium 30 MG tablet Take 30 mg by mouth daily     Historical Provider, MD   MILK THISTLE PO Take by mouth 2 times daily     Historical Provider, MD   Coenzyme Q10 (COQ10 PO) Take 100 mg by mouth daily     Historical Provider, MD   Omega-3 Fatty Acids (OMEGA 3 PO) Take by mouth 2 times daily     Historical Provider, MD   multivitamin [facility]) per tablet Take 1 tablet by mouth daily. Historical Provider, MD       Current medications:    Current Facility-Administered Medications   Medication Dose Route Frequency Provider Last Rate Last Admin    0.9 % sodium chloride infusion   IntraVENous Continuous Dana Nielson MD   Stopped at 10/22/21 1228    lidocaine-EPINEPHrine 1 %-[id number] injection    PRN Dana Nielson MD   11 mL at 10/22/21 1158    erythromycin [facility]) ophthalmic ointment    PRN Dana Nielson MD   1 Dose at 10/22/21 1215       Allergies: Allergies   Allergen Reactions    Latex Hives    Prolia [Denosumab] Other (See Comments)     Hand and feet tingle and throat tightness       Problem List:    Patient Active Problem List   Diagnosis Code    Tremor R25.1    Anxiety F41.9    Sleep apnea G47.30    Vitamin D deficiency E55.9    Essential tremor G25.0    Thyroid nodule E04.1    Inflammatory polyps of colon ([address] K51.40    Sprain of neck S13. 9XXA    Thoracic sprain and strain [id number]    Sprain of ligament of lumbosacral joint S33. 9XXA    Wrist fracture S62.109A    Age related osteoporosis M81.0    Pure hypercholesterolemia E78.00    Osteoarthritis of left patellofemoral joint M17.12    Disorder of paranasal sinus J34.9    Daytime somnolence R40.0    Current smoker F17.200    Bruxism F45.8    Bronchiectasis (HCC) J47.9    Dysphonia of essential tremor R25.1, R49.0    Multiple thyroid nodules E04.2       Past Medical History:        Diagnosis Date    Asthma     Bronchiectasis ([address] 01/2021    Essential tremor 01/01/2011    Fx. left wrist 05/20/2014    GERD (gastroesophageal reflux disease)     Inflammatory polyps of colon ([address] 12/01/2013    Osteoarthritis     cervical    Osteoporosis 01/01/2013    Tremors of nervous system 11/01/2012    [place] essential tremors       Past Surgical History:        Procedure Laterality Date    APPENDECTOMY      CATARACT REMOVAL Bilateral 08/2021    HYSTERECTOMY      TOOTH EXTRACTION Right        Social History:    Social History     Tobacco Use    Smoking status: Former Smoker     Packs/day: 0.75     Years: 2.00     Pack years: 1.50     Types: Cigarettes     Start date: 2018     Quit date: 2021     Years since quittin.8    Smokeless tobacco: Never Used   Substance Use Topics    Alcohol use: Not Currently     Alcohol/week: 0.0 standard drinks                                Counseling given: Not Answered      Vital Signs (Current):   Vitals:    10/14/21 1500 10/22/21 1052 10/22/21 1230   BP:  (!) 141/63 (!) 144/82   Pulse:  84 71   Resp:  16 18   Temp:  96.7 °F (35.9 °C) 97.2 °F (36.2 °C)   TempSrc:  Skin Temporal   SpO2:  96% 97%   Weight: 131 lb (59.4 kg) 136 lb (61.7 kg)    Height: 5' 2\" (1.575 m) 5' 2\" (1.575 m)                                               BP Readings from Last 3 Encounters:   10/22/21 (!) 144/82   10/22/21 (!) 100/57   21 122/78       NPO Status: Time of last liquid consumption: 06                        Time of last solid consumption:                         Date of last liquid consumption: 10/22/21                        Date of last solid food consumption: 10/21/21    BMI:   Wt Readings from Last 3 Encounters:   10/22/21 136 lb (61.7 kg)   21 131 lb (59.4 kg)   21 131 lb 11.2 oz (59.7 kg)     Body mass index is 24.87 kg/m².     CBC:   Lab Results   Component Value Date    WBC 8.5 10/04/2021    RBC 4.85 10/04/2021    RBC 5.08 2015    HGB 14.2 10/04/2021    HCT 42.0 10/04/2021    MCV 86.6 10/04/2021    RDW 13.6 10/04/2021     10/04/2021       CMP:   Lab Results   Component Value Date     10/04/2021    K 4.4 10/04/2021     10/04/2021    CO2 31 10/04/2021    BUN 14 10/04/2021    CREATININE 0.73 10/04/2021    GLUCOSE 85 10/04/2021    PROT 6.5 2020    CALCIUM 9.10 10/04/2021    BILITOT 0.6 2020    ALKPHOS 61 2020    AST 23 2020    ALT 16 2020 POC Tests: No results for input(s): POCGLU, POCNA, POCK, POCCL, POCBUN, POCHEMO, POCHCT in the last 72 hours. Coags: No results found for: PROTIME, INR, APTT    HCG (If Applicable): No results found for: PREGTESTUR, PREGSERUM, HCG, HCGQUANT     ABGs: No results found for: PHART, PO2ART, KLM7LLU, TYL5FZA, BEART, O5CKWCDG     Type & Screen (If Applicable):  No results found for: LABABO, LABRH    Drug/Infectious Status (If Applicable):  Lab Results   Component Value Date    HEPCAB NEGATIVE 11/14/2016       COVID-19 Screening (If Applicable): No results found for: COVID19        Anesthesia Evaluation  Patient summary reviewed and Nursing notes reviewed no history of anesthetic complications:   Airway: Mallampati: II  TM distance: >3 FB   Neck ROM: limited  Mouth opening: > = 3 FB Dental:          Pulmonary:normal exam  breath sounds clear to auscultation  (+) sleep apnea:  asthma: current smoker          Patient did not smoke on day of surgery. Cardiovascular:Negative CV ROS  Exercise tolerance: good (>4 METS),         ECG reviewed                        Neuro/Psych:   (+) depression/anxiety              ROS comment: tremor GI/Hepatic/Renal:   (+) GERD:,           Endo/Other:    (+) hypothyroidism (thyroid nodules)::., .                 Abdominal:             Vascular: negative vascular ROS. Other Findings:             Anesthesia Plan      MAC     ASA 3       Induction: intravenous. Anesthetic plan and risks discussed with patient. Plan discussed with CRNA.                   [address] Shari Gaston, DO   10/22/2021

## 2021-11-16 ENCOUNTER — OFFICE VISIT (OUTPATIENT)
Dept: FAMILY MEDICINE CLINIC | Age: 66
End: 2021-11-16

## 2021-11-16 VITALS
HEIGHT: 62 IN | RESPIRATION RATE: 16 BRPM | HEART RATE: 88 BPM | SYSTOLIC BLOOD PRESSURE: 118 MMHG | WEIGHT: 133.38 LBS | TEMPERATURE: 96.9 F | DIASTOLIC BLOOD PRESSURE: 64 MMHG | BODY MASS INDEX: 24.54 KG/M2

## 2021-11-16 DIAGNOSIS — J47.9 BRONCHIECTASIS WITHOUT COMPLICATION (HCC): ICD-10-CM

## 2021-11-16 DIAGNOSIS — K51.40 PSEUDOPOLYPOSIS OF COLON, UNSPECIFIED COMPLICATION STATUS, UNSPECIFIED PART OF COLON (HCC): ICD-10-CM

## 2021-11-16 DIAGNOSIS — M17.12 PRIMARY OSTEOARTHRITIS OF LEFT KNEE: ICD-10-CM

## 2021-11-16 DIAGNOSIS — Z00.00 ROUTINE GENERAL MEDICAL EXAMINATION AT A HEALTH CARE FACILITY: ICD-10-CM

## 2021-11-16 DIAGNOSIS — M81.0 AGE RELATED OSTEOPOROSIS, UNSPECIFIED PATHOLOGICAL FRACTURE PRESENCE: Primary | ICD-10-CM

## 2021-11-16 PROCEDURE — G0439 PPPS, SUBSEQ VISIT: HCPCS | Performed by: FAMILY MEDICINE

## 2021-11-16 PROCEDURE — 99213 OFFICE O/P EST LOW 20 MIN: CPT | Performed by: FAMILY MEDICINE

## 2021-11-16 SDOH — ECONOMIC STABILITY: FOOD INSECURITY: WITHIN THE PAST 12 MONTHS, THE FOOD YOU BOUGHT JUST DIDN'T LAST AND YOU DIDN'T HAVE MONEY TO GET MORE.: NEVER TRUE

## 2021-11-16 SDOH — ECONOMIC STABILITY: FOOD INSECURITY: WITHIN THE PAST 12 MONTHS, YOU WORRIED THAT YOUR FOOD WOULD RUN OUT BEFORE YOU GOT MONEY TO BUY MORE.: NEVER TRUE

## 2021-11-16 ASSESSMENT — PATIENT HEALTH QUESTIONNAIRE - PHQ9
1. LITTLE INTEREST OR PLEASURE IN DOING THINGS: 0
SUM OF ALL RESPONSES TO PHQ9 QUESTIONS 1 & 2: 0
SUM OF ALL RESPONSES TO PHQ QUESTIONS 1-9: 0
2. FEELING DOWN, DEPRESSED OR HOPELESS: 0
SUM OF ALL RESPONSES TO PHQ QUESTIONS 1-9: 0
SUM OF ALL RESPONSES TO PHQ QUESTIONS 1-9: 0

## 2021-11-16 ASSESSMENT — LIFESTYLE VARIABLES
HAVE YOU OR SOMEONE ELSE BEEN INJURED AS A RESULT OF YOUR DRINKING: 0
HOW MANY STANDARD DRINKS CONTAINING ALCOHOL DO YOU HAVE ON A TYPICAL DAY: 0
HOW OFTEN DO YOU HAVE A DRINK CONTAINING ALCOHOL: 1
HOW OFTEN DURING THE LAST YEAR HAVE YOU FOUND THAT YOU WERE NOT ABLE TO STOP DRINKING ONCE YOU HAD STARTED: 0
AUDIT TOTAL SCORE: 1
HAS A RELATIVE, FRIEND, DOCTOR, OR ANOTHER HEALTH PROFESSIONAL EXPRESSED CONCERN ABOUT YOUR DRINKING OR SUGGESTED YOU CUT DOWN: 0
HOW OFTEN DURING THE LAST YEAR HAVE YOU NEEDED AN ALCOHOLIC DRINK FIRST THING IN THE MORNING TO GET YOURSELF GOING AFTER A NIGHT OF HEAVY DRINKING: 0
AUDIT-C TOTAL SCORE: 1
HOW OFTEN DURING THE LAST YEAR HAVE YOU HAD A FEELING OF GUILT OR REMORSE AFTER DRINKING: 0
HOW OFTEN DURING THE LAST YEAR HAVE YOU FAILED TO DO WHAT WAS NORMALLY EXPECTED FROM YOU BECAUSE OF DRINKING: 0
HOW OFTEN DO YOU HAVE SIX OR MORE DRINKS ON ONE OCCASION: 0
HOW OFTEN DURING THE LAST YEAR HAVE YOU BEEN UNABLE TO REMEMBER WHAT HAPPENED THE NIGHT BEFORE BECAUSE YOU HAD BEEN DRINKING: 0

## 2021-11-16 ASSESSMENT — SOCIAL DETERMINANTS OF HEALTH (SDOH): HOW HARD IS IT FOR YOU TO PAY FOR THE VERY BASICS LIKE FOOD, HOUSING, MEDICAL CARE, AND HEATING?: NOT HARD AT ALL

## 2021-11-16 NOTE — PATIENT INSTRUCTIONS
Personalized Preventive Plan for Rivera Galvez - 11/16/2021  Medicare offers a range of preventive health benefits. Some of the tests and screenings are paid in full while other may be subject to a deductible, co-insurance, and/or copay. Some of these benefits include a comprehensive review of your medical history including lifestyle, illnesses that may run in your family, and various assessments and screenings as appropriate. After reviewing your medical record and screening and assessments performed today your provider may have ordered immunizations, labs, imaging, and/or referrals for you. A list of these orders (if applicable) as well as your Preventive Care list are included within your After Visit Summary for your review. Other Preventive Recommendations:    · A preventive eye exam performed by an eye specialist is recommended every 1-2 years to screen for glaucoma; cataracts, macular degeneration, and other eye disorders. · A preventive dental visit is recommended every 6 months. · Try to get at least 150 minutes of exercise per week or 10,000 steps per day on a pedometer . · Order or download the FREE \"Exercise & Physical Activity: Your Everyday Guide\" from [employer]. Call 3-470.125.6950 or search [employer] online. · You need 7884-0036 mg of calcium and 1561-1923 IU of vitamin D per day. It is possible to meet your calcium requirement with diet alone, but a vitamin D supplement is usually necessary to meet this goal.  · When exposed to the sun, use a sunscreen that protects against both UVA and UVB radiation with an SPF of 30 or greater. Reapply every 2 to 3 hours or after sweating, drying off with a towel, or swimming. · Always wear a seat belt when traveling in a car. Always wear a helmet when riding a bicycle or motorcycle.

## 2021-11-16 NOTE — PROGRESS NOTES
Medicare Annual Wellness Visit  Name: Cuco Dee Date: 2021   MRN: [id number] Sex: Female   Age: 78 y.o. Ethnicity: Non- / Non    : 1955 Race: White (non-)      Amy Cordova is here for Medicare AWV    Screenings for behavioral, psychosocial and functional/safety risks, and cognitive dysfunction are all negative except as indicated below. These results, as well as other patient data from the [address] [facility] [place] form, are documented in Flowsheets linked to this Encounter. Allergies   Allergen Reactions    Latex Hives    Prolia [Denosumab] Other (See Comments)     Hand and feet tingle and throat tightness       Prior to Visit Medications    Medication Sig Taking? Authorizing Provider   prednisoLONE acetate (PRED FORTE) 1 % ophthalmic suspension  Yes Historical Provider, MD   albuterol sulfate HFA (VENTOLIN HFA) 108 (90 Base) MCG/ACT inhaler Inhale 2 puffs into the lungs 4 times daily Yes Historical Provider, MD   ZINC PO Take by mouth daily  Yes Historical Provider, MD   TURMERIC PO Take by mouth 2 times daily  Yes Historical Provider, MD   docusate sodium (COLACE) 100 MG capsule Take 100 mg by mouth 2 times daily as needed  Yes Historical Provider, MD   Calcium Citrate-Vitamin D (CALCIUM CITRATE + PO) Take 630 mg by mouth daily. Two daily Yes Historical Provider, MD   magnesium 30 MG tablet Take 30 mg by mouth daily  Yes Historical Provider, MD   Coenzyme Q10 (COQ10 PO) Take 100 mg by mouth daily  Yes Historical Provider, MD   Omega-3 Fatty Acids (OMEGA 3 PO) Take by mouth 2 times daily  Yes Historical Provider, MD   multivitamin [facility]) per tablet Take 1 tablet by mouth daily.    Yes Historical Provider, MD   UNABLE TO FIND Indications: CBD oil, using for tremors Taking 1/2 to full dropper PRN   Patient not taking: Reported on 2021  Historical Provider, MD       Past Medical History:   Diagnosis Date    Asthma     Bronchiectasis ([address] 2021    Essential tremor 01/01/2011    Fx. left wrist 05/20/2014    GERD (gastroesophageal reflux disease)     Inflammatory polyps of colon (HCC) 12/01/2013    Osteoarthritis     cervical    Osteoporosis 01/01/2013    Tremors of nervous system 11/01/2012    [place] essential tremors       Past Surgical History:   Procedure Laterality Date    APPENDECTOMY      CATARACT REMOVAL Bilateral 08/2021    HYSTERECTOMY      MOHS SURGERY Right 10/22/2021    MOHS REPAIR BCC RIGHT MEDIAL LOWER PERIORBITAL performed by Georgina White MD at [address] [place] [address] Dr EXTRACTION Right        Family History   Problem Relation Age of Onset    Cancer Mother         Breast and Skin Cancer    Other Mother         tremor    Heart Disease Mother         Afib    Heart Disease Father     Kidney Disease Father     Other Father         Inability to produce blood    Heart Attack Father     Cancer Father         Colon Cancer and then Prostate Cancer that met to Bone Cancer    Other Maternal Grandmother         tremor    Anxiety Disorder Sister     Depression Sister     Thyroid Disease Sister         Nodules    High Cholesterol Sister        CareTeam (Including outside providers/suppliers regularly involved in providing care):   Patient Care Team:  Jennifer Friedman MD as PCP - General (Family Medicine)  Jennifer Friedman MD as PCP - [facility] Empaneled Provider  Jane Angelo MD as Consulting Physician (Neurology)    Wt Readings from Last 3 Encounters:   11/16/21 133 lb 6 oz (60.5 kg)   10/22/21 136 lb (61.7 kg)   08/31/21 131 lb (59.4 kg)     Vitals:    11/16/21 1355   BP: 118/64   Site: Right Upper Arm   Position: Sitting   Cuff Size: Medium Adult   Pulse: 88   Resp: 16   Temp: 96.9 °F (36.1 °C)   TempSrc: Skin   Weight: 133 lb 6 oz (60.5 kg)   Height: 5' 2\" (1.575 m)     Body mass index is 24.39 kg/m².     Based upon direct observation of the patient, evaluation of cognition reveals recent and remote memory intact. We reviewed her being due for the mamm and dexa. She had went to [facility] for some left knee pain and was told to wear a brace. She brought four and they seem to slide down. I encouraged her to follow up with the GI for the past polyp  She sees the pulmonologist regularly  General Appearance: alert and oriented to person, place and time, well-developed and well-nourished, in no acute distress  Skin: warm and dry, no rash or erythema  Head: normocephalic and atraumatic  Eyes: pupils equal, round, and reactive to light, extraocular eye movements intact, conjunctivae normal  ENT: tympanic membrane, external ear and ear canal normal bilaterally, oropharynx clear and moist with normal mucous membranes  Neck: neck supple and non tender without mass, no thyromegaly or thyroid nodules, no cervical lymphadenopathy   Pulmonary/Chest: clear to auscultation bilaterally- no wheezes, rales or rhonchi, normal air movement, no respiratory distress  Cardiovascular: normal rate, regular rhythm, normal S1 and S2, no murmurs, no gallops, intact distal pulses and no carotid bruits  Abdomen: soft, non-tender, non-distended, normal bowel sounds, no masses or organomegaly  Extremities: no cyanosis and no clubbing  Musculoskeletal: normal range of motion, no joint swelling, deformity or tenderness  Neurologic: gait and coordination normal and speech normal    Patient's complete Health Risk Assessment and screening values have been reviewed and are found in Flowsheets. The following problems were reviewed today and where indicated follow up appointments were made and/or referrals ordered. Positive Risk Factor Screenings with Interventions:          General Health and ACP:  General  In general, how would you say your health is?: Very Good  In the past 7 days, have you experienced any of the following?  New or Increased Pain, New or Increased Fatigue, Loneliness, Social Isolation, Stress or Anger?: (!) Stress  Do you get the social and emotional support that you need?: Yes  Do you have a Living Will?: Yes  Advance Directives     Power of  Living Will ACP-Advance Directive ACP-Power of     Not on File Not on File Not on File Not on File      General Health Risk Interventions:  · Stress: her mother and  are ill    Health Habits/Nutrition:  Health Habits/Nutrition  Do you exercise for at least 20 minutes 2-3 times per week?: (!) No  Have you lost any weight without trying in the past 3 months?: No  Do you eat only one meal per day?: No  Have you seen the dentist within the past year?: Yes  Body mass index: 24.39  Health Habits/Nutrition Interventions:  · lack of time to exercise        Personalized Preventive Plan   Current Health Maintenance Status  Immunization History   Administered Date(s) Administered    COVID-19, [employer]st, PF, 30mcg/0.3mL 02/26/2021, 03/26/2021, 10/07/2021    FLUZONE 3 YEARS AND OVER 10/10/2013, 09/18/2014    Influenza Virus Vaccine 11/19/2015, 08/30/2021    Influenza, MDCK Quadv, IM, PF (Flucelvax 2 yrs and older) 10/23/2017    Influenza, Ana Roughen, 6 mo and older, IM (Fluzone, Flulaval) 10/23/2018    Influenza, Quadv, IM, (6 mo and older Fluzone, Flulaval, Fluarix and 3 yrs and older Afluria) 11/16/2016    Influenza, Quadv, IM, PF (6 mo and older Fluzone, Flulaval, Fluarix, and 3 yrs and older Afluria) 09/09/2020    Influenza, Quadv, adjuvanted, 65 yrs +, IM, PF (Fluad) 09/09/2020    Influenza, Triv, inactivated, subunit, adjuvanted, IM (Fluad 65 yrs and older) 10/02/2019, 09/09/2020    Pneumococcal Conjugate 13-valent ([id number]) 10/02/2019    Pneumococcal Polysaccharide ([id number]) 11/19/2015, 09/16/2021    Tdap (Boostrix, Adacel) 11/16/2016    Zoster Live (Zostavax) 11/22/2013    Zoster Recombinant (Shingrix) 09/21/2020, 12/05/2020        Health Maintenance   Topic Date Due    Annual Wellness Visit (AWV)  06/30/2021    Breast cancer screen  12/17/2021    Colon cancer screen colonoscopy  12/16/2023    Lipid screen  05/10/2026    DTaP/Tdap/Td vaccine (2 - Td or Tdap) 11/16/2026    DEXA (modify frequency per FRAX score)  Completed    Flu vaccine  Completed    Shingles Vaccine  Completed    Pneumococcal 65+ years Vaccine  Completed    COVID-19 Vaccine  Completed    Hepatitis C screen  Completed    Hepatitis A vaccine  Aged Out    Hepatitis B vaccine  Aged Out    Hib vaccine  Aged Out    Meningococcal (ACWY) vaccine  Aged Out     Recommendations for [facility] Due: see orders and patient instructions/AVS.  . Recommended screening schedule for the next 5-10 years is provided to the patient in written form: see Patient Keisha Lee was seen today for medicare aw.     Diagnoses and all orders for this visit:    Age related osteoporosis, unspecified pathological fracture presence  -     THEODORE Dexa Bone Density Scan; Future    Pseudopolyposis of colon, unspecified complication status, unspecified part of colon ([address]    Bronchiectasis without complication ([address]

## 2021-11-17 LAB
VITAMIN B-12: 500 PG/ML (ref 180–914)
VITAMIN D 25-HYDROXY: 57.3 NG/ML (ref 30–100)

## 2021-11-28 DIAGNOSIS — E53.8 VITAMIN B12 DEFICIENCY: Primary | ICD-10-CM

## 2021-11-28 DIAGNOSIS — E78.00 PURE HYPERCHOLESTEROLEMIA: ICD-10-CM

## 2021-11-28 DIAGNOSIS — E55.9 VITAMIN D DEFICIENCY: ICD-10-CM

## 2021-11-28 RX ORDER — CHOLECALCIFEROL (VITAMIN D3) 125 MCG
1 CAPSULE ORAL DAILY
COMMUNITY
Start: 2021-11-28 | End: 2022-07-18

## 2021-11-29 ENCOUNTER — TELEPHONE (OUTPATIENT)
Dept: FAMILY MEDICINE CLINIC | Age: 66
End: 2021-11-29

## 2021-11-29 NOTE — TELEPHONE ENCOUNTER
----- Message from Cristo Robertson MD sent at 11/28/2021 10:02 PM EST -----  Let her know that the vit B12 was ok. The vit D was getting lower and it would be good to begin the vit D3 now at 2000 units once daily with food for better absorption. Check the labs again in late May.

## 2022-01-03 ENCOUNTER — TELEPHONE (OUTPATIENT)
Dept: FAMILY MEDICINE CLINIC | Age: 67
End: 2022-01-03

## 2022-01-03 DIAGNOSIS — M81.0 AGE RELATED OSTEOPOROSIS, UNSPECIFIED PATHOLOGICAL FRACTURE PRESENCE: Primary | ICD-10-CM

## 2022-01-03 NOTE — TELEPHONE ENCOUNTER
With her continuing to have osteoporosis, it would be good to have her see Dr Sanjay Raphael. As an endocrinologist, he would specialize in osteoporosis treatment.  If she agrees I can do the referral.

## 2022-01-04 NOTE — TELEPHONE ENCOUNTER
Faxed referral, demo, labs, imaging, insurance and photo id to Dr. Antonio Lyon, they will contact pt to schedule.  MOM to call office back

## 2022-04-04 DIAGNOSIS — M81.0 AGE RELATED OSTEOPOROSIS, UNSPECIFIED PATHOLOGICAL FRACTURE PRESENCE: ICD-10-CM

## 2022-04-04 NOTE — TELEPHONE ENCOUNTER
Date of last visit:  11/16/2021  Date of next visit:  Visit date not found    Requested Prescriptions     Pending Prescriptions Disp Refills    raloxifene (EVISTA) 60 MG tablet [Pharmacy Med Name: RALOXIFENE HCL TABS 60MG] 90 tablet 3     Sig: TAKE 1 TABLET DAILY

## 2022-04-05 RX ORDER — RALOXIFENE HYDROCHLORIDE 60 MG/1
TABLET, FILM COATED ORAL
Qty: 90 TABLET | Refills: 3 | OUTPATIENT
Start: 2022-04-05

## 2022-04-05 NOTE — TELEPHONE ENCOUNTER
Let her know that I will not renew the med in case Dr Ya Mosqueda would want to use something else. If she runs out before the appointment with him it will be alright.

## 2022-04-05 NOTE — TELEPHONE ENCOUNTER
Called Dr. Nickolas Carrel office, they lost pts referall. They are going to call her today to get her scheduled.

## 2022-04-08 DIAGNOSIS — M81.0 AGE RELATED OSTEOPOROSIS, UNSPECIFIED PATHOLOGICAL FRACTURE PRESENCE: ICD-10-CM

## 2022-04-08 RX ORDER — RALOXIFENE HYDROCHLORIDE 60 MG/1
60 TABLET, FILM COATED ORAL DAILY
Qty: 90 TABLET | Refills: 0 | Status: SHIPPED | OUTPATIENT
Start: 2022-04-08

## 2022-04-08 NOTE — TELEPHONE ENCOUNTER
Patient called, has an appt with Dr Beba Crane in May. [facility] sent her a letter asking about the Evista. Advised her Dr Rafa Chacon would be sending a 90 day to them.

## 2022-07-04 DIAGNOSIS — M81.0 AGE RELATED OSTEOPOROSIS, UNSPECIFIED PATHOLOGICAL FRACTURE PRESENCE: ICD-10-CM

## 2022-07-05 RX ORDER — RALOXIFENE HYDROCHLORIDE 60 MG/1
TABLET, FILM COATED ORAL
Qty: 90 TABLET | Refills: 3 | OUTPATIENT
Start: 2022-07-05

## 2022-07-06 NOTE — TELEPHONE ENCOUNTER
Pt informed.
She should get the evista RF through Dr Giovanni Virgen as he is managing her osteoporosis.
The pharmacy is  requesting a refill of the below medication which has been pended for you:     Requested Prescriptions     Pending Prescriptions Disp Refills    raloxifene (EVISTA) 60 MG tablet [Pharmacy Med Name: RALOXIFENE HCL TABS 60MG] 90 tablet 3     Sig: TAKE 1 TABLET DAILY       Last Appointment Date: 11/16/2021  Next Appointment Date: 11/17/2022    Allergies   Allergen Reactions    Latex Hives    Prolia [Denosumab] Other (See Comments)     Hand and feet tingle and throat tightness
PROCEDURES:  Insertion, intracranial pressure monitor catheter 18-Dec-2021 14:12:27  Modesto Ruano

## 2022-07-18 ENCOUNTER — OFFICE VISIT (OUTPATIENT)
Dept: NEUROLOGY | Age: 67
End: 2022-07-18
Payer: MEDICARE

## 2022-07-18 VITALS
BODY MASS INDEX: 26.06 KG/M2 | OXYGEN SATURATION: 95 % | DIASTOLIC BLOOD PRESSURE: 68 MMHG | WEIGHT: 138 LBS | HEART RATE: 85 BPM | SYSTOLIC BLOOD PRESSURE: 128 MMHG | HEIGHT: 61 IN

## 2022-07-18 DIAGNOSIS — G25.0 ESSENTIAL TREMOR: Primary | ICD-10-CM

## 2022-07-18 DIAGNOSIS — R49.8 VOICE TREMOR: ICD-10-CM

## 2022-07-18 PROCEDURE — 1123F ACP DISCUSS/DSCN MKR DOCD: CPT | Performed by: PSYCHIATRY & NEUROLOGY

## 2022-07-18 PROCEDURE — 99213 OFFICE O/P EST LOW 20 MIN: CPT | Performed by: PSYCHIATRY & NEUROLOGY

## 2022-07-18 NOTE — PROGRESS NOTES
NEUROLOGY OUT PATIENT FOLLOW UP NOTE:  7/18/202211:47 AM    Sarahi Alonzo is here for follow up for essential tremor. She is on no medications for tremor by choice. She is trying CBD oil for her symptoms. She is here discussed the plan of care going forward          Allergies   Allergen Reactions    Latex Hives    Prolia [Denosumab] Other (See Comments)     Hand and feet tingle and throat tightness       Current Outpatient Medications:     montelukast (SINGULAIR) 10 MG tablet, , Disp: , Rfl:     raloxifene (EVISTA) 60 MG tablet, Take 1 tablet by mouth daily, Disp: 90 tablet, Rfl: 0    Elastic Bandages & Supports (KNEE BRACE) MISC, Knee brace, Disp: 1 each, Rfl: 2    albuterol sulfate HFA (PROVENTIL;VENTOLIN;PROAIR) 108 (90 Base) MCG/ACT inhaler, Inhale 2 puffs into the lungs 4 times daily, Disp: , Rfl:     ZINC PO, Take by mouth daily , Disp: , Rfl:     TURMERIC PO, Take by mouth 2 times daily , Disp: , Rfl:     docusate sodium (COLACE) 100 MG capsule, Take 100 mg by mouth 2 times daily as needed , Disp: , Rfl:     Calcium Citrate-Vitamin D (CALCIUM CITRATE + PO), Take 630 mg by mouth daily. Two daily, Disp: , Rfl:     magnesium 30 MG tablet, Take 30 mg by mouth daily , Disp: , Rfl:     Coenzyme Q10 (COQ10 PO), Take 100 mg by mouth daily , Disp: , Rfl:     Omega-3 Fatty Acids (OMEGA 3 PO), Take by mouth 2 times daily , Disp: , Rfl:     multivitamin (THERAGRAN) per tablet, Take 1 tablet by mouth daily. , Disp: , Rfl:       PE:   Vitals:    07/18/22 1131   BP: 128/68   Site: Right Upper Arm   Position: Sitting   Cuff Size: Medium Adult   Pulse: 85   SpO2: 95%   Weight: 138 lb (62.6 kg)   Height: 5' 1\" (1.549 m)     General Appearance:  awake, alert, oriented, in no acute distress, she is wearing mask. Gen: NAD, Language is Intact. Head: no rash, no icterus  Neck: There is no carotid bruits. The Neck is supple. Neuro: CN 2-12 grossly intact with no focal deficits.  Power 5/5 Throughout symmetric, Reflexes are symmetric. Long tracts are intact. Cerebellar exam is Intact. Sensory exam is intact to light touch. Gait is intact. She has bilateral symmetric action tremor in her bilateral upper extremities. She has mild head tremor. She has voice tremor  Musculoskeletal:  Has no hand arthritis, no limitation of ROM in any of the four extremities. Lower extremities no edema        DATA:  Results for orders placed or performed in visit on 22   T4, Free   Result Value Ref Range    T4 Free 9.16    Basic Metabolic Panel   Result Value Ref Range    Sodium 140 mmol/L    Chloride 101 mmol/L    Potassium 4.7 mmol/L    BUN 14 mg/dL    CREATININE 0.73     Glucose 98 mg/dL    CO2 31 mmol/L    Calcium 9.5 mg/dL    Gfr Calculated 85    Uric Acid   Result Value Ref Range    Uric Acid 3.6           Results for orders placed in visit on 12   MRI Cervical Spine WO Contrast       Results for orders placed in visit on 16   MRI Brain WO Contrast    Narrative Ordering Provider: [address] [place] [address]        Radiology Department Patient:  Davion Peña. :  1955   Sex: Eloy, 89 [facility] Location:  [place] [address]  Unit #:   [id number]     Acct #:  [de-identified]     Ordering Phys: Favio Camacho MD         Exam Date: 16    Accession #:  [id number]    Exam:  MRI   MRI Brain Without Contrast    Result:             STUDY:   MRI BRAIN WITHOUT CONTRAST          REASON FOR EXAM:   Female, 57years old. Action tremors          TECHNIQUE:   Standardized multiplanar fat and water weighted pulse     sequences were obtained. COMPARISON:   December 3, 2012     ___________________________________          FINDINGS:     There is moderate atrophy and mild periventricular white matter ischemic     changes without mass effect or restricted diffusion. .          Normal bilateral basal ganglia. Normal thalami. There is no extra-axial     fluid accumulation. suggested trial on Cogentin with her voice tremor. She states she had bad experience with medications in the past. She may be a candidate for DBS. She is not interested and states she has looked into it and not ready to do that yet. After detailed discussion with patient we agreed on the following plan. Plan:  Follow up in 12 months. Call if any questions or concerns. Total time 21 min.      Christiano Elmore MD

## 2022-11-29 ENCOUNTER — OFFICE VISIT (OUTPATIENT)
Dept: FAMILY MEDICINE CLINIC | Age: 67
End: 2022-11-29

## 2022-11-29 VITALS
DIASTOLIC BLOOD PRESSURE: 60 MMHG | HEIGHT: 62 IN | WEIGHT: 134 LBS | RESPIRATION RATE: 16 BRPM | SYSTOLIC BLOOD PRESSURE: 126 MMHG | BODY MASS INDEX: 24.66 KG/M2 | HEART RATE: 84 BPM

## 2022-11-29 DIAGNOSIS — J47.9 BRONCHIECTASIS WITHOUT COMPLICATION (HCC): ICD-10-CM

## 2022-11-29 DIAGNOSIS — Z00.00 MEDICARE ANNUAL WELLNESS VISIT, SUBSEQUENT: Primary | ICD-10-CM

## 2022-11-29 DIAGNOSIS — M81.0 AGE RELATED OSTEOPOROSIS, UNSPECIFIED PATHOLOGICAL FRACTURE PRESENCE: ICD-10-CM

## 2022-11-29 PROCEDURE — 99213 OFFICE O/P EST LOW 20 MIN: CPT | Performed by: FAMILY MEDICINE

## 2022-11-29 PROCEDURE — G0439 PPPS, SUBSEQ VISIT: HCPCS | Performed by: FAMILY MEDICINE

## 2022-11-29 PROCEDURE — 1123F ACP DISCUSS/DSCN MKR DOCD: CPT | Performed by: FAMILY MEDICINE

## 2022-11-29 SDOH — ECONOMIC STABILITY: FOOD INSECURITY: WITHIN THE PAST 12 MONTHS, YOU WORRIED THAT YOUR FOOD WOULD RUN OUT BEFORE YOU GOT MONEY TO BUY MORE.: NEVER TRUE

## 2022-11-29 SDOH — ECONOMIC STABILITY: FOOD INSECURITY: WITHIN THE PAST 12 MONTHS, THE FOOD YOU BOUGHT JUST DIDN'T LAST AND YOU DIDN'T HAVE MONEY TO GET MORE.: NEVER TRUE

## 2022-11-29 ASSESSMENT — PATIENT HEALTH QUESTIONNAIRE - PHQ9
2. FEELING DOWN, DEPRESSED OR HOPELESS: 1
1. LITTLE INTEREST OR PLEASURE IN DOING THINGS: 1
SUM OF ALL RESPONSES TO PHQ QUESTIONS 1-9: 2
SUM OF ALL RESPONSES TO PHQ9 QUESTIONS 1 & 2: 2
SUM OF ALL RESPONSES TO PHQ QUESTIONS 1-9: 2

## 2022-11-29 ASSESSMENT — SOCIAL DETERMINANTS OF HEALTH (SDOH): HOW HARD IS IT FOR YOU TO PAY FOR THE VERY BASICS LIKE FOOD, HOUSING, MEDICAL CARE, AND HEATING?: NOT HARD AT ALL

## 2022-11-29 ASSESSMENT — LIFESTYLE VARIABLES
HOW MANY STANDARD DRINKS CONTAINING ALCOHOL DO YOU HAVE ON A TYPICAL DAY: PATIENT DOES NOT DRINK
HOW OFTEN DO YOU HAVE A DRINK CONTAINING ALCOHOL: NEVER

## 2022-11-29 NOTE — PROGRESS NOTES
Medicare Annual Wellness Visit    Ana Rosa Tilley is here for Medicare AWV    Assessment & Plan   Age related osteoporosis, unspecified pathological fracture presence  Bronchiectasis without complication ([facility] [address]    Recommendations for Preventive Services Due: see orders and patient instructions/AVS.  Recommended screening schedule for the next 5-10 years is provided to the patient in written form: see Patient Instructions/AVS.     No follow-ups on file. Subjective   The following acute and/or chronic problems were also addressed today:  We reviewed how she had fractured the left heel and her Dx of osteoporosis. She is working with Dr Lynn Hall on that  We reviewed that she sees the pulmonologist and uses the inhaler  She had ulcerative colitis in the 1970's but is much better now. Patient's complete Health Risk Assessment and screening values have been reviewed and are found in Flowsheets. The following problems were reviewed today and where indicated follow up appointments were made and/or referrals ordered. Positive Risk Factor Screenings with Interventions:             General Health and ACP:  General  In general, how would you say your health is?: Very Good  In the past 7 days, have you experienced any of the following: New or Increased Pain, New or Increased Fatigue, Loneliness, Social Isolation, Stress or Anger?: No  Do you get the social and emotional support that you need?: Yes  Do you have a Living Will?: Yes    Advance Directives       Power of  Living Will ACP-Advance Directive ACP-Power of     Not on File Not on File Not on File Not on File        General Health Risk Interventions:   Answers were reviewed    Health Habits/Nutrition:  Physical Activity: Inactive    Days of Exercise per Week: 0 days    Minutes of Exercise per Session: 0 min     Have you lost any weight without trying in the past 3 months?: No  Body mass index: 24.51  Have you seen the dentist within the past year?: Yes  Health Habits/Nutrition Interventions:  Above reviewed             Objective   Vitals:    11/29/22 1057   BP: 126/60   Site: Right Upper Arm   Position: Sitting   Cuff Size: Medium Adult   Pulse: 84   Resp: 16   Weight: 134 lb (60.8 kg)   Height: 5' 2\" (1.575 m)      Body mass index is 24.51 kg/m². General Appearance: alert and oriented to person, place and time, well-developed and well-nourished, in no acute distress  Skin: warm and dry, no rash or erythema  Head: normocephalic and atraumatic  Eyes: pupils equal, round, and reactive to light, extraocular eye movements intact, conjunctivae normal  ENT: tympanic membrane, external ear and ear canal normal bilaterally, oropharynx clear and moist with normal mucous membranes  Neck: neck supple and non tender without mass, no thyromegaly or thyroid nodules, no cervical lymphadenopathy   Pulmonary/Chest: clear to auscultation bilaterally- no wheezes, rales or rhonchi, normal air movement, no respiratory distress  Cardiovascular: normal rate, regular rhythm, normal S1 and S2, no murmurs, no gallops, and no carotid bruits  Abdomen: soft, non-tender, non-distended, normal bowel sounds, no masses or organomegaly  Extremities: no cyanosis and no clubbing  Musculoskeletal: normal range of motion, no joint swelling, deformity or tenderness  Neurologic: gait and coordination normal and speech normal       Allergies   Allergen Reactions    Latex Hives    Prolia [Denosumab] Other (See Comments)     Hand and feet tingle and throat tightness     Prior to Visit Medications    Medication Sig Taking? Authorizing Provider   risedronate (ACTONEL) 35 MG tablet Take 1 tablet by mouth every 7 days Take once per week in the morning with a full glass of water, on an empty stomach, and do not take anything else by mouth or lie down for the next 30 minutes.  Yes Delphine Lin MD   montelukast (SINGULAIR) 10 MG tablet  Yes Historical Provider, MD   Elastic Bandages & Supports (KNEE BRACE) MISC Knee brace Yes Sue Peterson MD   albuterol sulfate HFA (PROVENTIL;VENTOLIN;PROAIR) 108 (90 Base) MCG/ACT inhaler Inhale 2 puffs into the lungs 4 times daily Yes Historical Provider, MD   ZINC PO Take by mouth daily  Yes Historical Provider, MD   TURMERIC PO Take by mouth 2 times daily  Yes Historical Provider, MD   docusate sodium (COLACE) 100 MG capsule Take 100 mg by mouth 2 times daily as needed  Yes Historical Provider, MD   Calcium Citrate-Vitamin D (CALCIUM CITRATE + PO) Take 630 mg by mouth daily. Two daily Yes Historical Provider, MD   magnesium 30 MG tablet Take 30 mg by mouth daily  Yes Historical Provider, MD   Coenzyme Q10 (COQ10 PO) Take 100 mg by mouth daily  Yes Historical Provider, MD   Omega-3 Fatty Acids (OMEGA 3 PO) Take by mouth 2 times daily  Yes Historical Provider, MD   multivitamin [facility]) per tablet Take 1 tablet by mouth daily.    Yes Historical Provider, MD       CareTeam (Including outside providers/suppliers regularly involved in providing care):   Patient Care Team:  Sue Peterson MD as PCP - General (Family Medicine)  Sue Peterson MD as PCP - [facility] Empaneled Provider  Ok Wells MD as Consulting Physician (Neurology)     Reviewed and updated this visit:  Tobacco  Allergies  Meds  Med Hx  Surg Hx  Soc Hx  Fam Hx

## 2022-11-29 NOTE — PATIENT INSTRUCTIONS
Personalized Preventive Plan for Carissa Aleman - 11/29/2022  Medicare offers a range of preventive health benefits. Some of the tests and screenings are paid in full while other may be subject to a deductible, co-insurance, and/or copay. Some of these benefits include a comprehensive review of your medical history including lifestyle, illnesses that may run in your family, and various assessments and screenings as appropriate. After reviewing your medical record and screening and assessments performed today your provider may have ordered immunizations, labs, imaging, and/or referrals for you. A list of these orders (if applicable) as well as your Preventive Care list are included within your After Visit Summary for your review. Other Preventive Recommendations:    A preventive eye exam performed by an eye specialist is recommended every 1-2 years to screen for glaucoma; cataracts, macular degeneration, and other eye disorders. A preventive dental visit is recommended every 6 months. Try to get at least 150 minutes of exercise per week or 10,000 steps per day on a pedometer . Order or download the FREE \"Exercise & Physical Activity: Your Everyday Guide\" from [employer]. Call 4-724.412.4351 or search [employer] online. You need 7244-4467 mg of calcium and 5715-8157 IU of vitamin D per day. It is possible to meet your calcium requirement with diet alone, but a vitamin D supplement is usually necessary to meet this goal.  When exposed to the sun, use a sunscreen that protects against both UVA and UVB radiation with an SPF of 30 or greater. Reapply every 2 to 3 hours or after sweating, drying off with a towel, or swimming. Always wear a seat belt when traveling in a car. Always wear a helmet when riding a bicycle or motorcycle.

## 2023-01-01 NOTE — PROGRESS NOTES
NEUROLOGY OUT PATIENT FOLLOW UP NOTE:  7/9/202112:01 PM    Almita Tyler is here for follow up for essential tremor. She is on no medications for tremor by choice. She is trying CBD oil for her symptoms. She is here discussed the plan of care going forward    ROS:  Respiratory : no cough, no shortness of breath  Cardiac: no chest pain. No palpitations. Renal : no flank pain, no hematuria    Skin: no rash      Allergies   Allergen Reactions    Latex Hives    Prolia [Denosumab] Other (See Comments)     Hand and feet tingle and throat tightness    Amoxicillin Rash       Current Outpatient Medications:     ofloxacin (OCUFLOX) 0.3 % solution, , Disp: , Rfl:     prednisoLONE acetate (PRED FORTE) 1 % ophthalmic suspension, , Disp: , Rfl:     albuterol sulfate HFA (VENTOLIN HFA) 108 (90 Base) MCG/ACT inhaler, Inhale 2 puffs into the lungs 4 times daily, Disp: , Rfl:     raloxifene (EVISTA) 60 MG tablet, TAKE 1 TABLET DAILY, Disp: 90 tablet, Rfl: 3    UNABLE TO FIND, Indications: CBD oil, using for tremors Taking 1/2 to full dropper PRN , Disp: , Rfl:     ZINC PO, Take by mouth daily , Disp: , Rfl:     TURMERIC PO, Take by mouth 2 times daily , Disp: , Rfl:     docusate sodium (COLACE) 100 MG capsule, Take 100 mg by mouth 2 times daily as needed , Disp: , Rfl:     Calcium Citrate-Vitamin D (CALCIUM CITRATE + PO), Take 630 mg by mouth daily. Two daily, Disp: , Rfl:     magnesium 30 MG tablet, Take 30 mg by mouth daily , Disp: , Rfl:     MILK THISTLE PO, Take by mouth 2 times daily , Disp: , Rfl:     Coenzyme Q10 (COQ10 PO), Take 100 mg by mouth daily , Disp: , Rfl:     Omega-3 Fatty Acids (OMEGA 3 PO), Take by mouth 2 times daily , Disp: , Rfl:     multivitamin (THERAGRAN) per tablet, Take 1 tablet by mouth daily.   , Disp: , Rfl:     vitamin B-12 (CYANOCOBALAMIN) 1000 MCG tablet, Take 1 tablet by mouth every other day (Patient not taking: Reported on 7/9/2021), Disp: , Rfl:       PE:   Vitals: 21 1139   BP: 138/80   Site: Left Upper Arm   Position: Sitting   Cuff Size: Medium Adult   Pulse: 104   SpO2: 98%   Weight: 134 lb (60.8 kg)   Height: 5' 2\" (1.575 m)     General Appearance:  awake, alert, oriented, in no acute distress, she is wearing mask. Gen: NAD, Language is Intact. Head: no rash, no icterus  Neck: There is no carotid bruits. The Neck is supple. Neuro: CN 2-12 grossly intact with no focal deficits. Power 5/5 Throughout symmetric, Reflexes are symmetric. Long tracts are intact. Cerebellar exam is Intact. Sensory exam is intact to light touch. Gait is intact. She has bilateral symmetric action tremor in her bilateral upper extremities. She has mild head tremor. She has voice tremor  Musculoskeletal:  Has no hand arthritis, no limitation of ROM in any of the four extremities. Lower extremities no edema        DATA:  Results for orders placed or performed in visit on 21   LDL Cholesterol, Direct   Result Value Ref Range    LDL Direct 137 (H) <130 mg/dL   Vitamin B12   Result Value Ref Range    Vitamin B-12 511 180 - 914 pg/mL   Vitamin D 25 Hydroxy   Result Value Ref Range    Vit D, 25-Hydroxy 86.2 30 - 100 ng/mL          Results for orders placed in visit on 12   MRI Cervical Spine WO Contrast       Results for orders placed in visit on 16   MRI Brain WO Contrast    Narrative Ordering Provider: Pedro Jeff [id number]        Radiology Department Patient:  Cassandra Jarrell. :  1955   Sex: [place], [place] [facility] [place]  [phone]  Unit #:   [id number]     [facility] #:  [de-identified]     Ordering Phys: Pedro Renner MD         Exam Date: 16    Accession #:  [id number]    Exam:  MRI   MRI Brain Without Contrast    Result:             STUDY:   MRI BRAIN WITHOUT CONTRAST          REASON FOR EXAM:   Female, 57years old.   Action tremors          TECHNIQUE:   Standardized multiplanar fat and water Inpatient Pediatric Transfer Note    Transfer from: NICU  Transfer to: Well       Patient is a 1d old  Male who presents with a chief complaint of   HPI:      HOSPITAL COURSE:          Vital Signs Last 24 Hrs  T(C): 36.9 (14 Dec 2023 11:20), Max: 37 (14 Dec 2023 08:00)  T(F): 98.4 (14 Dec 2023 11:20), Max: 98.6 (14 Dec 2023 08:00)  HR: 136 (14 Dec 2023 11:20) (117 - 159)  BP: 65/43 (14 Dec 2023 08:00) (65/43 - 75/33)  BP(mean): 49 (14 Dec 2023 08:00) (43 - 53)  RR: 48 (14 Dec 2023 11:20) (30 - 115)  SpO2: 100% (14 Dec 2023 08:00) (98% - 100%)    Parameters below as of 14 Dec 2023 08:00  Patient On (Oxygen Delivery Method): room air            ASSESSMENT & PLAN: Essential tremor     2. Voice tremor            She is here for follow up for voice and action tremor, her voice tremor is worse. Her action tremor is the same. She feels CBD oil works and she takes that on her own. Her action tremor is worse and is not interested in medications as they did not help her and did not feel good on the medication. After detailed discussion with patient we agreed on the following plan. Plan:  1. Referral to speech therapy re voice tremor. 2. Follow up in 12 months. 3. Call if any questions or concerns. Total time 22 min.      Yajaira Christie MD Inpatient Pediatric Transfer Note    Transfer from: NICU  Transfer to: Well       Patient is a 1d old  Male who presents with a chief complaint of   HPI:        NICU Course:    "Respiratory: Respiratory failure due Retained Fetal Lung Fluid   RA   CXR - fluid in fissures c/w Retained Fetal Lung Fluid . BG:  umbiiical gases with mixed metabolic -respiratory acidosis, mild.  CBG 12- with improved and reassuring trends., clinically improved  Continuous cardiorespiratory monitoring for risk of apnea and bradycardia in the setting of respiratory failure.   s/p CPAP dc'd  hrs      CV: Hemodynamically stable.      FEN: Initial feeds limited by respiratory challenges  Feeds:  eHM or  (with parents acceptance) po ad guillermina up to 20 ml/feed after initial  feeds by OG, start at 10 mls.  Mom declined donor milk.    POC glucose monitoring as per protocol, acceptable screen   Fetal Hx of resolved echogenic bowel - no current clinical challenges on full feeds.  Heme: Hct on low normal side, screen for fetal maternal hemorrhage   H/H on admission 13.5/37.1 with 5.4 % retic, KB test requested from OB team.   MCV in mid 90's... potential need to screen for thalessemia when older.  Look to [facility] NBS for hgb screen.  No ABO set up, Observe for jaundice. Bilirubin - well subthreshold, repeat in am if still admitted..   ID: Screened for sepsis   WBC-diff acceptable  Neuro: Exam appropriate for GA.   Other: Tranfer to  under dr Musa's care"         Vital Signs Last 24 Hrs  T(C): 36.9 (14 Dec 2023 11:20), Max: 37 (14 Dec 2023 08:00)  T(F): 98.4 (14 Dec 2023 11:20), Max: 98.6 (14 Dec 2023 08:00)  HR: 136 (14 Dec 2023 11:20) (117 - 159)  BP: 65/43 (14 Dec 2023 08:00) (65/43 - 75/33)  BP(mean): 49 (14 Dec 2023 08:00) (43 - 53)  RR: 48 (14 Dec 2023 11:20) (30 - 115)  SpO2: 100% (14 Dec 2023 08:00) (98% - 100%)    Parameters below as of 14 Dec 2023 08:00  Patient On (Oxygen Delivery Method): room air    Physical Exam:  Gen: no acute distress, +grimace  HEENT:  anterior fontanel open soft and flat, nondysmoprhic facies, no cleft lip/palate, ears normal set, no ear pits or tags, nares clinically patent  Resp: Normal respiratory effort without grunting or retractions, good air entry b/l, clear to auscultation bilaterally  Cardio: Present S1/S2, regular rate and rhythm, no murmurs  Abd: soft, non tender, non distended, umbilical stump drying  Neuro: +palmar and plantar grasp, +suck, +candice, normal tone  Extremities: negative singleton and ortolani maneuvers, moving all extremities, no clavicular crepitus or stepoff  Skin: pink, warm  Genitals: Normal Ar I male anatomy, testicles palpable in scrotum b/l, anus patent      ASSESSMENT & PLAN:    Well appearing one day old re-admitted into [facility]. Baby has been feeding, voiding, and stooling appropriately. Vitals are stable, last RR is 48. Baby will receive routine  care.       Plan:   - routine care, strict I and O, daily weights  - bilirubin prior to discharge   - hearing screen  - CCHD,  screen  - parental education and anticipatory guidance. Inpatient Pediatric Transfer Note    Transfer from: NICU  Transfer to: Well       Patient is a 1d old  Male who presents with a chief complaint of   HPI:        NICU Course:    "Respiratory: Respiratory failure due Retained Fetal Lung Fluid   RA   CXR - fluid in fissures c/w Retained Fetal Lung Fluid . BG:  umbiiical gases with mixed metabolic -respiratory acidosis, mild.  CBG 12- with improved and reassuring trends., clinically improved  Continuous cardiorespiratory monitoring for risk of apnea and bradycardia in the setting of respiratory failure.   s/p CPAP dc'd  hrs      CV: Hemodynamically stable.      FEN: Initial feeds limited by respiratory challenges  Feeds:  eHM or  (with parents acceptance) po ad guillermina up to 20 ml/feed after initial  feeds by OG, start at 10 mls.  Mom declined donor milk.    POC glucose monitoring as per protocol, acceptable screen   Fetal Hx of resolved echogenic bowel - no current clinical challenges on full feeds.  Heme: Hct on low normal side, screen for fetal maternal hemorrhage   H/H on admission 13.5/37.1 with 5.4 % retic, KB test requested from OB team.   MCV in mid 90's... potential need to screen for thalessemia when older.  Look to [facility] NBS for hgb screen.  No ABO set up, Observe for jaundice. Bilirubin - well subthreshold, repeat in am if still admitted..   ID: Screened for sepsis   WBC-diff acceptable  Neuro: Exam appropriate for GA.   Other: Tranfer to  under dr Musa's care"         Vital Signs Last 24 Hrs  T(C): 36.9 (14 Dec 2023 11:20), Max: 37 (14 Dec 2023 08:00)  T(F): 98.4 (14 Dec 2023 11:20), Max: 98.6 (14 Dec 2023 08:00)  HR: 136 (14 Dec 2023 11:20) (117 - 159)  BP: 65/43 (14 Dec 2023 08:00) (65/43 - 75/33)  BP(mean): 49 (14 Dec 2023 08:00) (43 - 53)  RR: 48 (14 Dec 2023 11:20) (30 - 115)  SpO2: 100% (14 Dec 2023 08:00) (98% - 100%)    Parameters below as of 14 Dec 2023 08:00  Patient On (Oxygen Delivery Method): room air    Physical Exam:  Gen: no acute distress, +grimace  HEENT:  anterior fontanel open soft and flat, nondysmoprhic facies, no cleft lip/palate, ears normal set, no ear pits or tags, nares clinically patent  Resp: Normal respiratory effort without grunting or retractions, good air entry b/l, clear to auscultation bilaterally  Cardio: Present S1/S2, regular rate and rhythm, no murmurs  Abd: soft, non tender, non distended, umbilical stump drying  Neuro: +palmar and plantar grasp, +suck, +candice, normal tone  Extremities: negative singleton and ortolani maneuvers, moving all extremities, no clavicular crepitus or stepoff  Skin: pink, warm  Genitals: Normal Ar I male anatomy, testicles palpable in scrotum b/l, anus patent      ASSESSMENT & PLAN:    Well appearing one day old re-admitted into [facility]. Baby has been feeding, voiding, and stooling appropriately. Vitals are stable, last RR is 48. Baby will receive routine  care.       Plan:   - routine care, strict I and O, daily weights  - bilirubin prior to discharge   - hearing screen  - CCHD,  screen  - parental education and anticipatory guidance.

## 2023-04-03 ENCOUNTER — TELEPHONE (OUTPATIENT)
Dept: FAMILY MEDICINE CLINIC | Age: 68
End: 2023-04-03

## 2023-04-03 NOTE — TELEPHONE ENCOUNTER
Pt called stating that she received a letter for jury duty. 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 She is asking for a letter to be excused.     Danae may be reached at [phone]

## 2023-07-17 ENCOUNTER — OFFICE VISIT (OUTPATIENT)
Dept: NEUROLOGY | Age: 68
End: 2023-07-17

## 2023-07-17 VITALS
WEIGHT: 128 LBS | HEIGHT: 62 IN | OXYGEN SATURATION: 94 % | BODY MASS INDEX: 23.55 KG/M2 | HEART RATE: 104 BPM | DIASTOLIC BLOOD PRESSURE: 65 MMHG | SYSTOLIC BLOOD PRESSURE: 105 MMHG

## 2023-07-17 DIAGNOSIS — G25.0 ESSENTIAL TREMOR: Primary | ICD-10-CM

## 2023-07-17 DIAGNOSIS — R49.8 VOICE TREMOR: ICD-10-CM

## 2023-07-17 PROCEDURE — 99213 OFFICE O/P EST LOW 20 MIN: CPT | Performed by: NURSE PRACTITIONER

## 2023-07-17 PROCEDURE — 1123F ACP DISCUSS/DSCN MKR DOCD: CPT | Performed by: NURSE PRACTITIONER

## 2023-07-17 NOTE — PROGRESS NOTES
NEUROLOGY OUT PATIENT FOLLOW UP NOTE:  7/17/202311:02 AM    Shanelle Blair is here for follow up for essential tremor           Allergies   Allergen Reactions    Latex Hives    Prolia [Denosumab] Other (See Comments)     Hand and feet tingle and throat tightness       Current Outpatient Medications:     risedronate (ACTONEL) 35 MG tablet, TAKE 1 TABLET BY MOUTH WEEKLY IN THE MORNING WITH 8 OZ OF PLAIN  WATER 30 MINUTES BEFORE FIRST  FOOD, DRINK OR MEDS. STAY  UPRIGHT FOR 30 MINS, Disp: 12 tablet, Rfl: 1    montelukast (SINGULAIR) 10 MG tablet, , Disp: , Rfl:     Elastic Bandages & Supports (KNEE BRACE) MISC, Knee brace, Disp: 1 each, Rfl: 2    albuterol sulfate HFA (PROVENTIL;VENTOLIN;PROAIR) 108 (90 Base) MCG/ACT inhaler, Inhale 2 puffs into the lungs 4 times daily, Disp: , Rfl:     ZINC PO, Take by mouth daily , Disp: , Rfl:     TURMERIC PO, Take by mouth 2 times daily , Disp: , Rfl:     docusate sodium (COLACE) 100 MG capsule, Take 1 capsule by mouth 2 times daily as needed, Disp: , Rfl:     Calcium Citrate-Vitamin D (CALCIUM CITRATE + PO), Take 630 mg by mouth daily. Two daily, Disp: , Rfl:     magnesium 30 MG tablet, Take 1 tablet by mouth daily, Disp: , Rfl:     Coenzyme Q10 (COQ10 PO), Take 100 mg by mouth daily , Disp: , Rfl:     Omega-3 Fatty Acids (OMEGA 3 PO), Take by mouth 2 times daily , Disp: , Rfl:     multivitamin (THERAGRAN) per tablet, Take 1 tablet by mouth daily, Disp: , Rfl:     I reviewed the past medical history, allergies, medications, social history and family history. PE:   Vitals:    07/17/23 1056   BP: 105/65   Site: Left Upper Arm   Position: Sitting   Cuff Size: Medium Adult   Pulse: (!) 104   SpO2: 94%   Weight: 128 lb (58.1 kg)   Height: 5' 2\" (1.575 m)     General Appearance:  awake, alert, oriented    Gen: NAD, Language is Intact. Head: no rash, no icterus  Neck: There is no carotid bruits. The Neck is supple. Neuro: CN 2-12 grossly intact with no focal deficits.  Power 5/5

## 2023-11-30 ENCOUNTER — OFFICE VISIT (OUTPATIENT)
Dept: FAMILY MEDICINE CLINIC | Age: 68
End: 2023-11-30

## 2023-11-30 VITALS
DIASTOLIC BLOOD PRESSURE: 60 MMHG | HEIGHT: 62 IN | BODY MASS INDEX: 24.48 KG/M2 | WEIGHT: 133 LBS | RESPIRATION RATE: 18 BRPM | HEART RATE: 64 BPM | SYSTOLIC BLOOD PRESSURE: 118 MMHG

## 2023-11-30 DIAGNOSIS — Z13.220 SCREENING, LIPID: ICD-10-CM

## 2023-11-30 DIAGNOSIS — Z78.0 POST-MENOPAUSAL: Primary | ICD-10-CM

## 2023-11-30 DIAGNOSIS — Z12.11 SCREEN FOR COLON CANCER: ICD-10-CM

## 2023-11-30 DIAGNOSIS — Z00.00 MEDICARE ANNUAL WELLNESS VISIT, SUBSEQUENT: Primary | ICD-10-CM

## 2023-11-30 PROCEDURE — G0439 PPPS, SUBSEQ VISIT: HCPCS | Performed by: FAMILY MEDICINE

## 2023-11-30 SDOH — ECONOMIC STABILITY: INCOME INSECURITY: HOW HARD IS IT FOR YOU TO PAY FOR THE VERY BASICS LIKE FOOD, HOUSING, MEDICAL CARE, AND HEATING?: NOT HARD AT ALL

## 2023-11-30 SDOH — ECONOMIC STABILITY: FOOD INSECURITY: WITHIN THE PAST 12 MONTHS, YOU WORRIED THAT YOUR FOOD WOULD RUN OUT BEFORE YOU GOT MONEY TO BUY MORE.: NEVER TRUE

## 2023-11-30 SDOH — ECONOMIC STABILITY: FOOD INSECURITY: WITHIN THE PAST 12 MONTHS, THE FOOD YOU BOUGHT JUST DIDN'T LAST AND YOU DIDN'T HAVE MONEY TO GET MORE.: NEVER TRUE

## 2023-11-30 SDOH — ECONOMIC STABILITY: HOUSING INSECURITY
IN THE LAST 12 MONTHS, WAS THERE A TIME WHEN YOU DID NOT HAVE A STEADY PLACE TO SLEEP OR SLEPT IN A SHELTER (INCLUDING NOW)?: NO

## 2023-11-30 ASSESSMENT — PATIENT HEALTH QUESTIONNAIRE - PHQ9
SUM OF ALL RESPONSES TO PHQ QUESTIONS 1-9: 0
1. LITTLE INTEREST OR PLEASURE IN DOING THINGS: 0
SUM OF ALL RESPONSES TO PHQ QUESTIONS 1-9: 0
SUM OF ALL RESPONSES TO PHQ9 QUESTIONS 1 & 2: 0
2. FEELING DOWN, DEPRESSED OR HOPELESS: 0

## 2023-11-30 ASSESSMENT — LIFESTYLE VARIABLES
HOW OFTEN DO YOU HAVE A DRINK CONTAINING ALCOHOL: MONTHLY OR LESS
HOW MANY STANDARD DRINKS CONTAINING ALCOHOL DO YOU HAVE ON A TYPICAL DAY: 1 OR 2

## 2023-12-11 LAB
CHOLESTEROL/HDL RATIO: 3.9 RATIO
CHOLESTEROL: 246 MG/DL
HDLC SERPL-MCNC: 63 MG/DL
LDL CHOLESTEROL CALCULATED: 162 MG/DL
LDL/HDL RATIO: 2.6 RATIO
TRIGL SERPL-MCNC: 106 MG/DL
VLDLC SERPL CALC-MCNC: 21 MG/DL

## 2023-12-12 ENCOUNTER — OFFICE VISIT (OUTPATIENT)
Age: 68
End: 2023-12-12
Payer: MEDICARE

## 2023-12-12 VITALS
WEIGHT: 134 LBS | BODY MASS INDEX: 24.66 KG/M2 | DIASTOLIC BLOOD PRESSURE: 74 MMHG | SYSTOLIC BLOOD PRESSURE: 116 MMHG | HEART RATE: 94 BPM | HEIGHT: 62 IN

## 2023-12-12 DIAGNOSIS — M81.0 MENOPAUSAL OSTEOPOROSIS: Primary | ICD-10-CM

## 2023-12-12 DIAGNOSIS — E04.2 MULTINODULAR GOITER: ICD-10-CM

## 2023-12-12 PROCEDURE — 1123F ACP DISCUSS/DSCN MKR DOCD: CPT | Performed by: INTERNAL MEDICINE

## 2023-12-12 PROCEDURE — G8399 PT W/DXA RESULTS DOCUMENT: HCPCS | Performed by: INTERNAL MEDICINE

## 2023-12-12 PROCEDURE — G8484 FLU IMMUNIZE NO ADMIN: HCPCS | Performed by: INTERNAL MEDICINE

## 2023-12-12 PROCEDURE — 1090F PRES/ABSN URINE INCON ASSESS: CPT | Performed by: INTERNAL MEDICINE

## 2023-12-12 PROCEDURE — 99214 OFFICE O/P EST MOD 30 MIN: CPT | Performed by: INTERNAL MEDICINE

## 2023-12-12 PROCEDURE — G8420 CALC BMI NORM PARAMETERS: HCPCS | Performed by: INTERNAL MEDICINE

## 2023-12-12 PROCEDURE — G8427 DOCREV CUR MEDS BY ELIG CLIN: HCPCS | Performed by: INTERNAL MEDICINE

## 2023-12-12 PROCEDURE — 1036F TOBACCO NON-USER: CPT | Performed by: INTERNAL MEDICINE

## 2023-12-12 PROCEDURE — 3017F COLORECTAL CA SCREEN DOC REV: CPT | Performed by: INTERNAL MEDICINE

## 2023-12-12 NOTE — PROGRESS NOTES
Multinodular goiter  Clinically stable. Monitor levels and ultrasound. Orders Placed This Encounter   Procedures    XR THORACIC SPINE (2 VIEWS)     Standing Status:   Future     Standing Expiration Date:   3/12/2024    XR LUMBAR SPINE (2-3 VIEWS)     Standing Status:   Future     Standing Expiration Date:   9/99/2994    Basic Metabolic Panel     Standing Status:   Future     Standing Expiration Date:   3/12/2024    Vitamin D 25 Hydroxy     Standing Status:   Future     Standing Expiration Date:   3/12/2024    TSH     Standing Status:   Future     Standing Expiration Date:   3/12/2024    T4, Free     Standing Status:   Future     Standing Expiration Date:   3/12/2024    T3, Free     Standing Status:   Future     Standing Expiration Date:   3/12/2024       The risks and benefits of my recommendations, as well as other treatment options were discussed with the patient today. Questions were answered. Follow up: 6 month and as needed. Electronically signed by Tae Castellanos MD 12/12/2023 5:40 PM     **This report has been created using voice recognition software. It may   contain minor errors which are inherent in voice recognition technology. **

## 2023-12-17 DIAGNOSIS — E04.2 MULTINODULAR GOITER: Primary | ICD-10-CM

## 2023-12-25 DIAGNOSIS — E78.00 PURE HYPERCHOLESTEROLEMIA: Primary | ICD-10-CM

## 2023-12-26 ENCOUNTER — TELEPHONE (OUTPATIENT)
Dept: FAMILY MEDICINE CLINIC | Age: 68
End: 2023-12-26

## 2023-12-26 NOTE — TELEPHONE ENCOUNTER
----- Message from Colleen Sykes MD sent at 12/25/2023 10:36 PM EST -----  Let her know that the LDL is way up from 3 years ago.  Check it again in a month and if no major change I would then suggest she begin lipid meds

## 2023-12-26 NOTE — RESULT ENCOUNTER NOTE
Let her know that the LDL is way up from 3 years ago. Check it again in a month and if no major change I would then suggest she begin lipid meds

## 2024-01-19 LAB
ALBUMIN SERPL-MCNC: 4.3 G/DL (ref 3.5–5)
ALP BLD-CCNC: 67 IU/L (ref 39–118)
ALT SERPL-CCNC: 15 IU/L (ref 10–40)
AST SERPL-CCNC: 20 IU/L (ref 15–41)
BILIRUB SERPL-MCNC: 0.5 MG/DL (ref 0.2–1)
BILIRUBIN DIRECT: <0.1 MG/DL (ref 0.1–0.2)
LDL CHOLESTEROL DIRECT: 147 MG/DL
TOTAL PROTEIN: 6.8 G/DL (ref 6.2–8)

## 2024-01-26 DIAGNOSIS — E78.00 PURE HYPERCHOLESTEROLEMIA: Primary | ICD-10-CM

## 2024-01-27 NOTE — RESULT ENCOUNTER NOTE
Let her know the LDL is higher than before. Keep trying to watch the diet and 20 minutes of constant movement daily if possible. Check the fasting LDL again in late July.
DASH Diet

## 2024-01-29 ENCOUNTER — TELEPHONE (OUTPATIENT)
Dept: FAMILY MEDICINE CLINIC | Age: 69
End: 2024-01-29

## 2024-01-29 NOTE — TELEPHONE ENCOUNTER
----- Message from Anderson Hemphill MD sent at 1/26/2024  8:17 PM EST -----  Let her know the LDL is higher than before. Keep trying to watch the diet and 20 minutes of constant movement daily if possible. Check the fasting LDL again in late July.

## 2024-01-29 NOTE — TELEPHONE ENCOUNTER
Pt called back states understanding, says shes had a lot of [employer] and she plans to cut back on those.

## 2024-02-03 ENCOUNTER — APPOINTMENT (OUTPATIENT)
Dept: GENERAL RADIOLOGY | Age: 69
End: 2024-02-03
Payer: MEDICARE

## 2024-02-03 ENCOUNTER — HOSPITAL ENCOUNTER (EMERGENCY)
Age: 69
Discharge: HOME | End: 2024-02-03
Payer: MEDICARE

## 2024-02-03 VITALS
TEMPERATURE: 97.3 F | RESPIRATION RATE: 16 BRPM | BODY MASS INDEX: 23.92 KG/M2 | HEIGHT: 62 IN | WEIGHT: 130 LBS | OXYGEN SATURATION: 96 % | HEART RATE: 96 BPM | SYSTOLIC BLOOD PRESSURE: 151 MMHG | DIASTOLIC BLOOD PRESSURE: 74 MMHG

## 2024-02-03 DIAGNOSIS — S80.12XA CONTUSION OF LEFT LOWER LEG, INITIAL ENCOUNTER: Primary | ICD-10-CM
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
## 2024-02-03 DIAGNOSIS — M53.3 ACUTE COCCYGEAL PAIN: ICD-10-CM

## 2024-02-03 PROCEDURE — 73590 X-RAY EXAM OF LOWER LEG: CPT

## 2024-02-03 PROCEDURE — 99203 OFFICE O/P NEW LOW 30 MIN: CPT | Performed by: NURSE PRACTITIONER

## 2024-02-03 PROCEDURE — 99213 OFFICE O/P EST LOW 20 MIN: CPT

## 2024-02-03 ASSESSMENT — PAIN - FUNCTIONAL ASSESSMENT
PAIN_FUNCTIONAL_ASSESSMENT: WONG-BAKER FACES
PAIN_FUNCTIONAL_ASSESSMENT: ACTIVITIES ARE NOT PREVENTED

## 2024-02-03 ASSESSMENT — ENCOUNTER SYMPTOMS
VISUAL CHANGE: 0
ABDOMINAL PAIN: 0
COUGH: 0
NAUSEA: 0
VOMITING: 0
APNEA: 0
STRIDOR: 0
BOWEL INCONTINENCE: 0
CHEST TIGHTNESS: 0
SHORTNESS OF BREATH: 0
WHEEZING: 0
CHOKING: 0

## 2024-02-03 ASSESSMENT — PAIN SCALES - WONG BAKER: WONGBAKER_NUMERICALRESPONSE: 2

## 2024-02-03 ASSESSMENT — PAIN DESCRIPTION - DESCRIPTORS: DESCRIPTORS: TENDER

## 2024-02-03 ASSESSMENT — PAIN DESCRIPTION - PAIN TYPE: TYPE: ACUTE PAIN

## 2024-02-03 ASSESSMENT — PAIN DESCRIPTION - ORIENTATION: ORIENTATION: LEFT;LOWER

## 2024-02-03 ASSESSMENT — PAIN DESCRIPTION - LOCATION: LOCATION: LEG

## 2024-02-03 NOTE — DISCHARGE INSTRUCTIONS
Heat 15-20 minutes 3 times a day   Activity as tolerated.    Take medication as directed  If symptoms persist follow up with PCP 1-2 weeks.

## 2024-02-03 NOTE — ED PROVIDER NOTES
[facility] URGENT CARE  Urgent Care Encounter      CHIEF COMPLAINT       Chief Complaint   Patient presents with    Fall     Fell 6 days ago, bruising on left lower leg       Nurses Notes reviewed and I agree except as noted in the HPI.  HISTORY OFPRESENT ILLNESS   Tonya Mckeon is a 68 y.o.  The history is provided by the patient. No  was used.   Fall  The accident occurred More than 1 week ago. Fall occurred: on outside stairs leaving her step fathers house. She fell from a height of 1 to 2 ft (on step,). She landed on A hard floor. There was no blood loss. Point of impact: buttocks. The pain is present in the left knee. The pain is at a severity of 2/10. The pain is mild. She was Ambulatory at the scene. There was No entrapment after the fall. There was No drug use involved in the accident. There was No alcohol use involved in the accident. Pertinent negatives include no visual change, no fever, no numbness, no abdominal pain, no bowel incontinence, no nausea, no vomiting, no hematuria, no headaches, no hearing loss, no loss of consciousness and no tingling. The symptoms are aggravated by activity. She has tried nothing for the symptoms. The treatment provided no relief.       REVIEW OF SYSTEMS     Review of Systems   Constitutional:  Negative for activity change, appetite change, chills, diaphoresis, fatigue and fever.   Respiratory:  Negative for apnea, cough, choking, chest tightness, shortness of breath, wheezing and stridor.    Cardiovascular:  Negative for chest pain, palpitations and leg swelling.   Gastrointestinal:  Negative for abdominal pain, bowel incontinence, nausea and vomiting.   Genitourinary:  Negative for hematuria.   Neurological:  Negative for tingling, loss of consciousness, numbness and headaches.       PAST MEDICAL HISTORY         Diagnosis Date    Asthma     Bronchiectasis (HCC) 01/2021    Essential tremor 01/01/2011    Fx. left wrist 05/20/2014    GERD  father and mother; Depression in her sister; Heart Attack in her father; Heart Disease in her father and mother; High Cholesterol in her sister; Kidney Disease in her father; Other in her father, maternal grandmother, and mother; Thyroid Disease in her sister.    SOCIAL HISTORY     Patient  reports that she has quit smoking. Her smoking use included cigarettes. She started smoking about 5 years ago. She has a 3.9 pack-year smoking history. She has never used smokeless tobacco. She reports that she does not currently use alcohol. She reports that she does not use drugs.    PHYSICAL EXAM     ED TRIAGE VITALS  BP: (!) 151/74, Temp: 97.3 °F (36.3 °C), Pulse: 96, Respirations: 16, SpO2: 96 %  Physical Exam  Vitals and nursing note reviewed.   Constitutional:       General: She is not in acute distress.     Appearance: Normal appearance. She is normal weight. She is not ill-appearing, toxic-appearing or diaphoretic.   HENT:      Head: Normocephalic and atraumatic.      Right Ear: External ear normal.      Left Ear: External ear normal.   Eyes:      Extraocular Movements: Extraocular movements intact.      Conjunctiva/sclera: Conjunctivae normal.   Cardiovascular:      Pulses: Normal pulses.           Dorsalis pedis pulses are 2+ on the right side and 2+ on the left side.        Posterior tibial pulses are 2+ on the right side and 2+ on the left side.   Pulmonary:      Effort: Pulmonary effort is normal.   Musculoskeletal:         General: Normal range of motion.      Cervical back: Normal range of motion.   Skin:     General: Skin is warm and dry.      Findings: Bruising and ecchymosis present.          Neurological:      General: No focal deficit present.      Mental Status: She is alert.   Psychiatric:         Mood and Affect: Mood normal.         Thought Content: Thought content normal.         Judgment: Judgment normal.         DIAGNOSTIC RESULTS   Labs:No results found for this visit on 02/03/24.    IMAGING:  XR

## 2024-02-03 NOTE — ED TRIAGE NOTES
Patient ambulated to room and states she fell on steps 6 days ago. States she was outside and fell down 1 step. States her tailbone was a little sore but she had a knot on her left lower leg. States yesterday she developed a large bruise on her left lower leg

## 2024-06-12 ENCOUNTER — OFFICE VISIT (OUTPATIENT)
Age: 69
End: 2024-06-12
Payer: MEDICARE

## 2024-06-12 VITALS
WEIGHT: 135.8 LBS | HEIGHT: 62 IN | DIASTOLIC BLOOD PRESSURE: 72 MMHG | SYSTOLIC BLOOD PRESSURE: 114 MMHG | BODY MASS INDEX: 24.99 KG/M2 | HEART RATE: 79 BPM

## 2024-06-12 DIAGNOSIS — M81.0 MENOPAUSAL OSTEOPOROSIS: Primary | ICD-10-CM

## 2024-06-12 DIAGNOSIS — E04.2 MULTINODULAR GOITER: ICD-10-CM

## 2024-06-12 PROCEDURE — 99214 OFFICE O/P EST MOD 30 MIN: CPT | Performed by: INTERNAL MEDICINE

## 2024-06-12 PROCEDURE — 1123F ACP DISCUSS/DSCN MKR DOCD: CPT | Performed by: INTERNAL MEDICINE

## 2024-06-12 PROCEDURE — 3017F COLORECTAL CA SCREEN DOC REV: CPT | Performed by: INTERNAL MEDICINE

## 2024-06-12 PROCEDURE — 1036F TOBACCO NON-USER: CPT | Performed by: INTERNAL MEDICINE

## 2024-06-12 PROCEDURE — 1090F PRES/ABSN URINE INCON ASSESS: CPT | Performed by: INTERNAL MEDICINE

## 2024-06-12 PROCEDURE — G8420 CALC BMI NORM PARAMETERS: HCPCS | Performed by: INTERNAL MEDICINE

## 2024-06-12 PROCEDURE — G8427 DOCREV CUR MEDS BY ELIG CLIN: HCPCS | Performed by: INTERNAL MEDICINE

## 2024-06-12 PROCEDURE — G8399 PT W/DXA RESULTS DOCUMENT: HCPCS | Performed by: INTERNAL MEDICINE

## 2024-06-12 NOTE — PROGRESS NOTES
adenopathy, no carotid bruit, no JVD and supple, symmetrical, trachea midline  Thyroid: There is no goiter or thyroid tenderness.  Lung:clear to auscultation bilaterally  Heart: regular rate and rhythm, S1, S2 normal, no murmur, click, rub or gallop and normal apical impulse  Abdomen:soft, non-tender; bowel sounds normal; no masses,  no organomegaly  Extremities:LEFT foot in boot.  Skin:warm and dry, no hyperpigmentation, vitiligo, or suspicious lesions  Neuro:normal without focal findings, mental status, speech normal, alert and oriented x3, FLORIN, cranial nerves 2-12 intact, muscle tone and strength normal and symmetric.  Lymph nodes: There is no cervical, supraclavicular or submental adenopathy.  Musculoskeletal:  Left foot in brace. No spinal tenderness.  Psychiatry: Alert and oriented ×3.  Making good eye contact.  Affect is normal.        Assessment/Plan:   Diagnosis Orders   1. Menopausal osteoporosis  Tolerating current therapy well.  Fall precautions reviewed.  Check vitamin D status.          2. Multinodular goiter  No compressive symptoms.  Will check labs.  Patient will get with PCP regarding follow-up ultrasound.              Orders Placed This Encounter   Procedures    Vitamin D 25 Hydroxy     Standing Status:   Future     Standing Expiration Date:   9/12/2024    Basic Metabolic Panel     Standing Status:   Future     Standing Expiration Date:   9/12/2024    TSH     Standing Status:   Future     Standing Expiration Date:   9/12/2024    T4, Free     Standing Status:   Future     Standing Expiration Date:   9/12/2024       The risks and benefits of my recommendations, as well as other treatment options were discussed with the patient today. Questions were answered.  Follow up: 6 month and as needed.         Electronically signed by Jaren Ospina MD 6/12/2024 11:27 AM     **This report has been created using voice recognition software. It may   contain minor errors which are inherent in voice recognition

## 2024-06-17 ENCOUNTER — TELEPHONE (OUTPATIENT)
Age: 69
End: 2024-06-17

## 2024-06-17 ENCOUNTER — CLINICAL DOCUMENTATION (OUTPATIENT)
Age: 69
End: 2024-06-17

## 2024-06-17 DIAGNOSIS — E04.2 MULTINODULAR GOITER: Primary | ICD-10-CM

## 2024-06-17 NOTE — PROGRESS NOTES
I spoke with the patient.  She would like to be referred to for an ultrasound for follow-up of her multinodular goiter.  She also would like a follow-up appointment with ENT.  Both of these have been ordered.

## 2024-06-19 NOTE — TELEPHONE ENCOUNTER
Dr Ospina states:  I spoke with the patient.  She would like to be referred to for an ultrasound for follow-up of her multinodular goiter.  She also would like a follow-up appointment with ENT.  Both of these have been ordered.     Spoke with pt, pt informed

## 2024-07-22 ENCOUNTER — OFFICE VISIT (OUTPATIENT)
Dept: NEUROLOGY | Age: 69
End: 2024-07-22
Payer: MEDICARE

## 2024-07-22 VITALS
HEIGHT: 62 IN | OXYGEN SATURATION: 96 % | HEART RATE: 86 BPM | BODY MASS INDEX: 24.29 KG/M2 | SYSTOLIC BLOOD PRESSURE: 109 MMHG | DIASTOLIC BLOOD PRESSURE: 64 MMHG | WEIGHT: 132 LBS

## 2024-07-22 DIAGNOSIS — G25.0 ESSENTIAL TREMOR: Primary | ICD-10-CM

## 2024-07-22 DIAGNOSIS — R49.8 VOICE TREMOR: ICD-10-CM

## 2024-07-22 PROCEDURE — 3017F COLORECTAL CA SCREEN DOC REV: CPT | Performed by: PSYCHIATRY & NEUROLOGY

## 2024-07-22 PROCEDURE — G8420 CALC BMI NORM PARAMETERS: HCPCS | Performed by: PSYCHIATRY & NEUROLOGY

## 2024-07-22 PROCEDURE — G8427 DOCREV CUR MEDS BY ELIG CLIN: HCPCS | Performed by: PSYCHIATRY & NEUROLOGY

## 2024-07-22 PROCEDURE — 1036F TOBACCO NON-USER: CPT | Performed by: PSYCHIATRY & NEUROLOGY

## 2024-07-22 PROCEDURE — 1123F ACP DISCUSS/DSCN MKR DOCD: CPT | Performed by: PSYCHIATRY & NEUROLOGY

## 2024-07-22 PROCEDURE — 1090F PRES/ABSN URINE INCON ASSESS: CPT | Performed by: PSYCHIATRY & NEUROLOGY

## 2024-07-22 PROCEDURE — G8399 PT W/DXA RESULTS DOCUMENT: HCPCS | Performed by: PSYCHIATRY & NEUROLOGY

## 2024-07-22 PROCEDURE — 99213 OFFICE O/P EST LOW 20 MIN: CPT | Performed by: PSYCHIATRY & NEUROLOGY

## 2024-07-22 NOTE — PROGRESS NOTES
NEUROLOGY OUT PATIENT FOLLOW UP NOTE:  7/22/202412:31 PM    Tonya Mckeon is here for follow up for essential tremor           Allergies   Allergen Reactions    Latex Hives    Prolia [Denosumab] Other (See Comments)     Hand and feet tingle and throat tightness       Current Outpatient Medications:     risedronate (ACTONEL) 35 MG tablet, TAKE 1 TABLET BY MOUTH WEEKLY IN THE MORNING WITH 8 OUNCES OF  PLAIN WATER 1/2 HOUR BEFORE  FIRST FOOD DRINK OR MEDS. STAY  UPRIGHT FOR 1/2 HOUR, Disp: 12 tablet, Rfl: 1    Magnesium Sulfate, Laxative, (EPSOM SALT) GRAN, Warm water soaks, Disp: 1 g, Rfl: 0    montelukast (SINGULAIR) 10 MG tablet, , Disp: , Rfl:     Elastic Bandages & Supports (KNEE BRACE) MISC, Knee brace, Disp: 1 each, Rfl: 2    albuterol sulfate HFA (PROVENTIL;VENTOLIN;PROAIR) 108 (90 Base) MCG/ACT inhaler, Inhale 2 puffs into the lungs 4 times daily, Disp: , Rfl:     ZINC PO, Take by mouth daily , Disp: , Rfl:     TURMERIC PO, Take by mouth 2 times daily , Disp: , Rfl:     docusate sodium (COLACE) 100 MG capsule, Take 1 capsule by mouth 2 times daily as needed, Disp: , Rfl:     Calcium Citrate-Vitamin D (CALCIUM CITRATE + PO), Take 630 mg by mouth daily. Two daily, Disp: , Rfl:     magnesium 30 MG tablet, Take 1 tablet by mouth daily, Disp: , Rfl:     Coenzyme Q10 (COQ10 PO), Take 100 mg by mouth daily , Disp: , Rfl:     Omega-3 Fatty Acids (OMEGA 3 PO), Take by mouth 2 times daily , Disp: , Rfl:     multivitamin (THERAGRAN) per tablet, Take 1 tablet by mouth daily, Disp: , Rfl:     I reviewed the past medical history, allergies, medications, social history and family history.       PE:   Vitals:    07/22/24 1222   BP: 109/64   Site: Left Upper Arm   Position: Sitting   Cuff Size: Medium Adult   Pulse: 86   SpO2: 96%   Weight: 59.9 kg (132 lb)   Height: 1.575 m (5' 2\")     General Appearance:  awake, alert, oriented    Gen: NAD, Language is Intact.  Head: no rash, no icterus  Neck: The Neck is supple.  Neuro:

## 2024-09-10 ENCOUNTER — TELEPHONE (OUTPATIENT)
Age: 69
End: 2024-09-10

## 2024-10-23 ENCOUNTER — OFFICE VISIT (OUTPATIENT)
Dept: ENT CLINIC | Age: 69
End: 2024-10-23
Payer: MEDICARE

## 2024-10-23 VITALS
DIASTOLIC BLOOD PRESSURE: 68 MMHG | HEART RATE: 95 BPM | HEIGHT: 62 IN | TEMPERATURE: 97.3 F | BODY MASS INDEX: 24.29 KG/M2 | SYSTOLIC BLOOD PRESSURE: 116 MMHG | RESPIRATION RATE: 16 BRPM | WEIGHT: 132 LBS | OXYGEN SATURATION: 95 %

## 2024-10-23 DIAGNOSIS — G25.2 INTENTION TREMOR: ICD-10-CM

## 2024-10-23 DIAGNOSIS — E04.1 THYROID NODULE: Primary | ICD-10-CM

## 2024-10-23 DIAGNOSIS — D44.0 NEOPLASM OF UNCERTAIN BEHAVIOR OF THYROID GLAND: ICD-10-CM

## 2024-10-23 PROCEDURE — 1090F PRES/ABSN URINE INCON ASSESS: CPT | Performed by: OTOLARYNGOLOGY

## 2024-10-23 PROCEDURE — 1159F MED LIST DOCD IN RCRD: CPT | Performed by: OTOLARYNGOLOGY

## 2024-10-23 PROCEDURE — G8484 FLU IMMUNIZE NO ADMIN: HCPCS | Performed by: OTOLARYNGOLOGY

## 2024-10-23 PROCEDURE — G8420 CALC BMI NORM PARAMETERS: HCPCS | Performed by: OTOLARYNGOLOGY

## 2024-10-23 PROCEDURE — 99213 OFFICE O/P EST LOW 20 MIN: CPT | Performed by: OTOLARYNGOLOGY

## 2024-10-23 PROCEDURE — G8427 DOCREV CUR MEDS BY ELIG CLIN: HCPCS | Performed by: OTOLARYNGOLOGY

## 2024-10-23 PROCEDURE — G8399 PT W/DXA RESULTS DOCUMENT: HCPCS | Performed by: OTOLARYNGOLOGY

## 2024-10-23 PROCEDURE — 4004F PT TOBACCO SCREEN RCVD TLK: CPT | Performed by: OTOLARYNGOLOGY

## 2024-10-23 PROCEDURE — 3017F COLORECTAL CA SCREEN DOC REV: CPT | Performed by: OTOLARYNGOLOGY

## 2024-10-23 PROCEDURE — 1123F ACP DISCUSS/DSCN MKR DOCD: CPT | Performed by: OTOLARYNGOLOGY

## 2024-10-23 NOTE — PROGRESS NOTES
[facility] [place] [facility]  [facility]  [address]   [address]  [facility] [address]  Dept: [phone]  Dept Fax: [phone]  Loc: [phone]    Tonya Mckeon is a 69 y.o. female who was referred by Jaren Ospina MD for:  Chief Complaint   Patient presents with    Other     Multinodular goiter - referred by Jaren Ospina MD. Patient states that everything is going well.       HPI:     Tonya Mckeon is a 69 y.o. female with a history of intention tremor of the larynx and a history of multiple thyroid nodules.  She recently started seeing Dr. Jaren Ospina, and Endocrinology who sent the patient back to me after the results of a recent thyroid ultrasound.  She has nodules in both sides of her neck but one of the nodules on the left side had worsened from a TR 4 to a TR 5.  The report stated the radiologist recommended a follow-up ultrasound in a year not accounting for the change from the prior ultrasound.  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  She is also considering deep brain stimulation treatment though she is very concerned about having a brain implant.     History:     Allergies   Allergen Reactions    Latex Hives    Prolia [Denosumab] Other (See Comments)     Hand and feet tingle and throat tightness     Current Outpatient Medications   Medication Sig Dispense Refill    MILK THISTLE PO Take 240 mg by mouth in the morning and at bedtime      risedronate (ACTONEL) 35 MG tablet TAKE 1 TABLET BY MOUTH WEEKLY IN THE MORNING WITH 8 OUNCES OF  PLAIN WATER 1/2 HOUR BEFORE  FIRST FOOD DRINK OR MEDS. STAY  UPRIGHT FOR 1/2 HOUR 12 tablet 1    Magnesium Sulfate, Laxative, (EPSOM SALT) GRAN Warm water soaks 1 g 0    montelukast (SINGULAIR) 10 MG tablet       Elastic Bandages & Supports (KNEE BRACE) MISC Knee brace 1 each 2    albuterol

## 2024-10-25 ENCOUNTER — HOSPITAL ENCOUNTER (OUTPATIENT)
Dept: ULTRASOUND IMAGING | Age: 69
Discharge: HOME | End: 2024-10-25
Attending: RADIOLOGY

## 2024-10-25 DIAGNOSIS — Z00.6 EXAMINATION FOR NORMAL COMPARISON OR CONTROL IN CLINICAL RESEARCH: ICD-10-CM

## 2024-11-08 ENCOUNTER — HOSPITAL ENCOUNTER (OUTPATIENT)
Dept: ULTRASOUND IMAGING | Age: 69
Discharge: HOME | End: 2024-11-08
Attending: OTOLARYNGOLOGY
Payer: MEDICARE

## 2024-11-08 DIAGNOSIS — E04.1 THYROID NODULE: ICD-10-CM

## 2024-11-08 PROCEDURE — 76942 ECHO GUIDE FOR BIOPSY: CPT

## 2024-11-15 ENCOUNTER — TELEPHONE (OUTPATIENT)
Dept: ENT CLINIC | Age: 69
End: 2024-11-15

## 2024-11-15 NOTE — TELEPHONE ENCOUNTER
Patient called in this morning wanting to know if the results from her biopsy are back yet, I told her that it was showing me that it hadn't been released yet but that I would let the provider know and let her know that we would call once we got them. Patient verbalized understanding and thanked me.

## 2024-12-02 ENCOUNTER — OFFICE VISIT (OUTPATIENT)
Dept: FAMILY MEDICINE CLINIC | Age: 69
End: 2024-12-02

## 2024-12-02 VITALS
HEIGHT: 62 IN | SYSTOLIC BLOOD PRESSURE: 120 MMHG | WEIGHT: 131.38 LBS | HEART RATE: 100 BPM | DIASTOLIC BLOOD PRESSURE: 70 MMHG | RESPIRATION RATE: 14 BRPM | BODY MASS INDEX: 24.18 KG/M2

## 2024-12-02 DIAGNOSIS — M81.0 AGE-RELATED OSTEOPOROSIS WITHOUT CURRENT PATHOLOGICAL FRACTURE: ICD-10-CM

## 2024-12-02 DIAGNOSIS — R25.1 TREMOR: ICD-10-CM

## 2024-12-02 DIAGNOSIS — Z00.00 MEDICARE ANNUAL WELLNESS VISIT, SUBSEQUENT: Primary | ICD-10-CM

## 2024-12-02 PROCEDURE — 99213 OFFICE O/P EST LOW 20 MIN: CPT | Performed by: FAMILY MEDICINE

## 2024-12-02 PROCEDURE — G0439 PPPS, SUBSEQ VISIT: HCPCS | Performed by: FAMILY MEDICINE

## 2024-12-02 PROCEDURE — 1123F ACP DISCUSS/DSCN MKR DOCD: CPT | Performed by: FAMILY MEDICINE

## 2024-12-02 SDOH — ECONOMIC STABILITY: FOOD INSECURITY: WITHIN THE PAST 12 MONTHS, YOU WORRIED THAT YOUR FOOD WOULD RUN OUT BEFORE YOU GOT MONEY TO BUY MORE.: NEVER TRUE

## 2024-12-02 SDOH — ECONOMIC STABILITY: FOOD INSECURITY: WITHIN THE PAST 12 MONTHS, THE FOOD YOU BOUGHT JUST DIDN'T LAST AND YOU DIDN'T HAVE MONEY TO GET MORE.: NEVER TRUE

## 2024-12-02 SDOH — ECONOMIC STABILITY: INCOME INSECURITY: HOW HARD IS IT FOR YOU TO PAY FOR THE VERY BASICS LIKE FOOD, HOUSING, MEDICAL CARE, AND HEATING?: NOT HARD AT ALL

## 2024-12-02 ASSESSMENT — PATIENT HEALTH QUESTIONNAIRE - PHQ9
SUM OF ALL RESPONSES TO PHQ QUESTIONS 1-9: 0
2. FEELING DOWN, DEPRESSED OR HOPELESS: NOT AT ALL
SUM OF ALL RESPONSES TO PHQ QUESTIONS 1-9: 0
SUM OF ALL RESPONSES TO PHQ9 QUESTIONS 1 & 2: 0
1. LITTLE INTEREST OR PLEASURE IN DOING THINGS: NOT AT ALL

## 2024-12-02 NOTE — PROGRESS NOTES
Pulse: 100   Resp: 14   Weight: 59.6 kg (131 lb 6 oz)   Height: 1.575 m (5' 2\")      Body mass index is 24.03 kg/m².      General Appearance: alert and oriented to person, place and time, well-developed and well-nourished, in no acute distress  Skin: warm and dry, no rash or erythema  Head: normocephalic and atraumatic  Eyes: pupils equal, round, and reactive to light, extraocular eye movements intact, conjunctivae normal  ENT: tympanic membrane, external ear and ear canal normal bilaterally, oropharynx clear and moist with normal mucous membranes  Neck: neck supple and non tender without mass, no thyromegaly or thyroid nodules, no cervical lymphadenopathy   Pulmonary/Chest: clear to auscultation bilaterally- no wheezes, rales or rhonchi, normal air movement, no respiratory distress  Cardiovascular: normal rate, regular rhythm, normal S1 and S2, no murmurs, no gallops, intact distal pulses, and no carotid bruits  Abdomen: soft, non-tender, non-distended, normal bowel sounds, no masses or organomegaly  Extremities: no cyanosis and no clubbing  Musculoskeletal: normal range of motion, no joint swelling, deformity or tenderness  Neurologic: gait and coordination normal and speech normal            Allergies   Allergen Reactions    Latex Hives    Prolia [Denosumab] Other (See Comments)     Hand and feet tingle and throat tightness     Prior to Visit Medications    Medication Sig Taking? Authorizing Provider   MILK THISTLE PO Take 240 mg by mouth in the morning and at bedtime Yes Gonsalo Palacios MD   Magnesium Sulfate, Laxative, (EPSOM SALT) GRAN Warm water soaks Yes Maritza Chilel APRN - CNP   montelukast (SINGULAIR) 10 MG tablet  Yes Gonsalo Palacios MD   Elastic Bandages & Supports (KNEE BRACE) MISC Knee brace Yes Anderson Hemphill MD   albuterol sulfate HFA (PROVENTIL;VENTOLIN;PROAIR) 108 (90 Base) MCG/ACT inhaler Inhale 2 puffs into the lungs 4 times daily Yes Gonsalo Palacios MD   ZINC PO

## 2024-12-03 ENCOUNTER — TELEPHONE (OUTPATIENT)
Dept: NEUROLOGY | Age: 69
End: 2024-12-03

## 2024-12-03 DIAGNOSIS — G25.0 ESSENTIAL TREMOR: Primary | ICD-10-CM

## 2024-12-03 NOTE — TELEPHONE ENCOUNTER
Patient would like to proceed with referral as discussed at last visit-- referral to movement disorder clinic, for evaluation re the tremor non medication intervention, US or DBS, as examples. Patient would like [facility].   Please advise.

## 2024-12-11 ENCOUNTER — OFFICE VISIT (OUTPATIENT)
Age: 69
End: 2024-12-11
Payer: MEDICARE

## 2024-12-11 VITALS
DIASTOLIC BLOOD PRESSURE: 80 MMHG | SYSTOLIC BLOOD PRESSURE: 108 MMHG | BODY MASS INDEX: 24.07 KG/M2 | RESPIRATION RATE: 18 BRPM | HEIGHT: 62 IN | WEIGHT: 130.8 LBS | HEART RATE: 78 BPM

## 2024-12-11 DIAGNOSIS — M81.0 MENOPAUSAL OSTEOPOROSIS: Primary | ICD-10-CM

## 2024-12-11 DIAGNOSIS — E04.2 MULTINODULAR GOITER: ICD-10-CM

## 2024-12-11 PROCEDURE — G8482 FLU IMMUNIZE ORDER/ADMIN: HCPCS | Performed by: INTERNAL MEDICINE

## 2024-12-11 PROCEDURE — 4004F PT TOBACCO SCREEN RCVD TLK: CPT | Performed by: INTERNAL MEDICINE

## 2024-12-11 PROCEDURE — 1159F MED LIST DOCD IN RCRD: CPT | Performed by: INTERNAL MEDICINE

## 2024-12-11 PROCEDURE — G8399 PT W/DXA RESULTS DOCUMENT: HCPCS | Performed by: INTERNAL MEDICINE

## 2024-12-11 PROCEDURE — 3017F COLORECTAL CA SCREEN DOC REV: CPT | Performed by: INTERNAL MEDICINE

## 2024-12-11 PROCEDURE — 1123F ACP DISCUSS/DSCN MKR DOCD: CPT | Performed by: INTERNAL MEDICINE

## 2024-12-11 PROCEDURE — G8420 CALC BMI NORM PARAMETERS: HCPCS | Performed by: INTERNAL MEDICINE

## 2024-12-11 PROCEDURE — G8427 DOCREV CUR MEDS BY ELIG CLIN: HCPCS | Performed by: INTERNAL MEDICINE

## 2024-12-11 PROCEDURE — 1160F RVW MEDS BY RX/DR IN RCRD: CPT | Performed by: INTERNAL MEDICINE

## 2024-12-11 PROCEDURE — 99214 OFFICE O/P EST MOD 30 MIN: CPT | Performed by: INTERNAL MEDICINE

## 2024-12-11 PROCEDURE — 1090F PRES/ABSN URINE INCON ASSESS: CPT | Performed by: INTERNAL MEDICINE

## 2024-12-11 NOTE — PROGRESS NOTES
disease)     Inflammatory polyps of colon (HCC) 12/01/2013    Osteoarthritis     cervical    Osteoporosis 01/01/2013    Pure hypercholesterolemia 12/11/2023    Skin cancer     Tremors of nervous system 11/01/2012    Mikaeln essential tremors      Past Surgical History:   Procedure Laterality Date    APPENDECTOMY      CATARACT REMOVAL Bilateral 08/2021    FOOT SURGERY Left     heel    HYSTERECTOMY (CERVIX STATUS UNKNOWN)      MOHS SURGERY Right 10/22/2021    MOHS REPAIR BCC RIGHT MEDIAL LOWER PERIORBITAL performed by Richy Salas MD at [facility] OR    TOOTH EXTRACTION Right     US THYROID CYST ASPIRATION AND OR INJECTION  11/8/2024    US THYROID CYST ASPIRATION AND OR INJECTION 11/8/2024 [facility] ULTR[facility]OUND       Family History   Problem Relation Age of Onset    Cancer Mother         Breast and Skin Cancer    Other Mother         tremor    Heart Disease Mother         Afib    Heart Disease Father     Kidney Disease Father     Other Father         Inability to produce blood    Heart Attack Father     Cancer Father         Colon Cancer and then Prostate Cancer that met to Bone Cancer    Other Maternal Grandmother         tremor    Anxiety Disorder Sister     Depression Sister     Thyroid Disease Sister         Nodules    High Cholesterol Sister      Social History     Tobacco Use    Smoking status: Every Day     Current packs/day: 2.00     Average packs/day: 0.8 packs/day for 6.1 years (4.8 ttl pk-yrs)     Types: Cigarettes     Start date: 11/1/2018    Smokeless tobacco: Never   Substance Use Topics    Alcohol use: Not Currently     Alcohol/week: 0.0 standard drinks of alcohol      Current Outpatient Medications   Medication Sig Dispense Refill    risedronate (ACTONEL) 35 MG tablet TAKE 1 TABLET BY MOUTH WEEKLY IN THE MORNING WITH 8 OUNCES OF  PLAIN WATER 1/2 HOUR BEFORE  FIRST FOOD DRINK OR MEDS. STAY  UPRIGHT FOR 1/2 HOUR 12 tablet 1    MILK THISTLE PO Take 240 mg by mouth in the morning and at bedtime      
reviewed and negative.       Objective:    /80 (Site: Left Upper Arm, Position: Sitting, Cuff Size: Medium Adult)   Pulse 78   Resp 18   Ht 1.575 m (5' 2\")   Wt 59.3 kg (130 lb 12.8 oz)   BMI 23.92 kg/m²   Body surface area is 1.61 meters squared.    Physical Exam   General: alert, appears stated age, cooperative and no distress   Eyes: negative findings: lids and lashes normal, conjunctivae and sclerae normal, corneas clear and pupils equal, round, reactive to light and accomodation  Neck:no adenopathy, no carotid bruit, no JVD and supple, symmetrical, trachea midline  Thyroid: There is no goiter or thyroid tenderness.  Lung:clear to auscultation bilaterally  Heart: regular rate and rhythm, S1, S2 normal, no murmur, click, rub or gallop and normal apical impulse  Abdomen:soft, non-tender; bowel sounds normal; no masses,  no organomegaly  Extremities:LEFT foot in boot.  Skin:warm and dry, no hyperpigmentation, vitiligo, or suspicious lesions  Neuro:normal without focal findings, mental status, speech normal, alert and oriented x3, FLORIN, cranial nerves 2-12 intact, muscle tone and strength normal and symmetric.  Lymph nodes: There is no cervical, supraclavicular or submental adenopathy.  Musculoskeletal:  Left foot in brace. No spinal tenderness.  Psychiatry: Alert and oriented ×3.  Making good eye contact.  Affect is normal.        Assessment/Plan:      Orders Placed This Encounter   Procedures    Vitamin D 25 Hydroxy     Standing Status:   Future     Standing Expiration Date:   3/11/2025    TSH     Standing Status:   Future     Standing Expiration Date:   3/11/2025    T4, Free     Standing Status:   Future     Standing Expiration Date:   3/11/2025    Basic Metabolic Panel     Standing Status:   Future     Standing Expiration Date:   3/11/2025       The risks and benefits of my recommendations, as well as other treatment options were discussed with the patient today. Questions were answered.  Follow up:

## 2024-12-12 LAB
ANION GAP, EXTERNAL: NORMAL
BUN, EXTERNAL: NORMAL
BUN/CREATININE RATIO, EXTERNAL: NORMAL
CALCIUM, EXTERNAL: 9.9
CHLORIDE, EXTERNAL: 101
CO2, EXTERNAL: 29
CREATININE, EXTERNAL: 0.68
EGFR IF AFA, EXTERNAL: NORMAL
EGFR IF NONAFRICAN AMERICAN: 94
GLUCOSE SER, EXTERNAL: 96
POTASSIUM, EXTERNAL: 4.3
SODIUM, EXTERNAL: 142
T4 FREE: 1.15
TSH SERPL DL<=0.05 MIU/L-ACNC: 2.35 UIU/ML
VITAMIN D 25-HYDROXY: 79
VITAMIN D2, 25 HYDROXY: NORMAL
VITAMIN D3,25 HYDROXY: NORMAL

## 2024-12-16 ENCOUNTER — TELEPHONE (OUTPATIENT)
Age: 69
End: 2024-12-16

## 2024-12-19 ENCOUNTER — HOSPITAL ENCOUNTER (EMERGENCY)
Age: 69
Discharge: HOME | End: 2024-12-19
Payer: MEDICARE

## 2024-12-19 VITALS
SYSTOLIC BLOOD PRESSURE: 145 MMHG | TEMPERATURE: 97.9 F | OXYGEN SATURATION: 96 % | DIASTOLIC BLOOD PRESSURE: 94 MMHG | HEART RATE: 103 BPM | HEIGHT: 62 IN | WEIGHT: 126 LBS | BODY MASS INDEX: 23.19 KG/M2 | RESPIRATION RATE: 16 BRPM

## 2024-12-19 DIAGNOSIS — N30.01 ACUTE CYSTITIS WITH HEMATURIA: Primary | ICD-10-CM

## 2024-12-19 LAB
BILIRUB UR STRIP.AUTO-MCNC: NEGATIVE MG/DL
CHARACTER UR: CLEAR
COLOR, UA: YELLOW
GLUCOSE UR QL STRIP.AUTO: NEGATIVE MG/DL
KETONES UR QL STRIP.AUTO: ABNORMAL
NITRITE UR QL STRIP.AUTO: NEGATIVE
PH UR STRIP.AUTO: 5.5 [PH] (ref 5–9)
PROT UR STRIP.AUTO-MCNC: NEGATIVE MG/DL
RBC #/AREA URNS HPF: ABNORMAL /[HPF]
SP GR UR STRIP.AUTO: <= 1.005 (ref 1–1.03)
UROBILINOGEN, URINE: 0.2 EU/DL (ref 0.2–1)
WBC #/AREA URNS HPF: ABNORMAL /[HPF]

## 2024-12-19 PROCEDURE — 87186 SC STD MICRODIL/AGAR DIL: CPT

## 2024-12-19 PROCEDURE — 87077 CULTURE AEROBIC IDENTIFY: CPT

## 2024-12-19 PROCEDURE — 99213 OFFICE O/P EST LOW 20 MIN: CPT

## 2024-12-19 PROCEDURE — 99214 OFFICE O/P EST MOD 30 MIN: CPT | Performed by: NURSE PRACTITIONER

## 2024-12-19 PROCEDURE — 81003 URINALYSIS AUTO W/O SCOPE: CPT

## 2024-12-19 PROCEDURE — 87086 URINE CULTURE/COLONY COUNT: CPT

## 2024-12-19 RX ORDER — NITROFURANTOIN 25; 75 MG/1; MG/1
100 CAPSULE ORAL 2 TIMES DAILY
Qty: 14 CAPSULE | Refills: 0 | Status: SHIPPED | OUTPATIENT
Start: 2024-12-19 | End: 2024-12-26

## 2024-12-19 ASSESSMENT — ENCOUNTER SYMPTOMS
SORE THROAT: 0
DIARRHEA: 0
RHINORRHEA: 0
VOMITING: 0
COUGH: 0
CHEST TIGHTNESS: 0
NAUSEA: 0
SHORTNESS OF BREATH: 0

## 2024-12-19 ASSESSMENT — PAIN - FUNCTIONAL ASSESSMENT: PAIN_FUNCTIONAL_ASSESSMENT: NONE - DENIES PAIN

## 2024-12-19 NOTE — ED PROVIDER NOTES
[facility] URGENT CARE  Urgent Care Encounter       CHIEF COMPLAINT       Chief Complaint   Patient presents with    Dysuria       Nurses Notes reviewed and I agree except as noted in the HPI.  HISTORY OF PRESENT ILLNESS   Tonya Mckeon is a 69 y.o. female who presents to the [facility] urgent care for evaluation of dysuria.  Patient reports noticing dark cloudy urine roughly 4 to 5 days ago.  She reports yesterday noticing dysuria, urgency, and frequency.  Denies fever or chills.  Denies gross abdominal or flank pain.      The history is provided by the patient. No  was used.       REVIEW OF SYSTEMS     Review of Systems   Constitutional:  Negative for activity change, appetite change, chills, fatigue and fever.   HENT:  Negative for ear discharge, ear pain, rhinorrhea and sore throat.    Respiratory:  Negative for cough, chest tightness and shortness of breath.    Cardiovascular:  Negative for chest pain.   Gastrointestinal:  Negative for diarrhea, nausea and vomiting.   Genitourinary:  Positive for dysuria, frequency and urgency.   Skin:  Negative for rash.   Allergic/Immunologic: Negative for environmental allergies and food allergies.   Neurological:  Negative for dizziness and headaches.       PAST MEDICAL HISTORY         Diagnosis Date    Asthma     Bronchiectasis (HCC) 01/2021    Essential tremor 01/01/2011    Fx. left wrist 05/20/2014    GERD (gastroesophageal reflux disease)     Inflammatory polyps of colon (HCC) 12/01/2013    Osteoarthritis     cervical    Osteoporosis 01/01/2013    Pure hypercholesterolemia 12/11/2023    Skin cancer     Tremors of nervous system 11/01/2012    Mitchel essential tremors       SURGICALHISTORY     Patient  has a past surgical history that includes Appendectomy; Hysterectomy; Tooth Extraction (Right); Cataract removal (Bilateral, 08/2021); Mohs surgery (Right, 10/22/2021); Foot surgery (Left); and US ASP/INJ THYROID CYST        Medications - No data to display         PROCEDURES:  None    FINAL IMPRESSION      1. Acute cystitis with hematuria          DISPOSITION/ PLAN     Patient seen and evaluated for dysuria.  A urinalysis was obtained revealing large leukocytes, small blood, and negative nitrates.  A urine culture has been ordered.  Patient is provided a prescription for Macrobid.  Instructed to push oral fluids.  The Patient is instructed to use over-the-counter Tylenol and Motrin for pain or fever.  Instructed to follow-up with their PCP or [facility] in 3 to 5 days and worsening symptoms.  The patient is agreeable with the above plan and denies questions or concerns at this time.      PATIENT REFERRED TO:  Anderson Hemphill MD  [address] [facility] [place] / [place] [id number]      DISCHARGE MEDICATIONS:  New Prescriptions    NITROFURANTOIN, MACROCRYSTAL-MONOHYDRATE, (MACROBID) 100 MG CAPSULE    Take 1 capsule by mouth 2 times daily for 7 days       Discontinued Medications    No medications on file       Current Discharge Medication List          MANUEL Jefferson CNP    (Please note that portions of this note were completed with a voice recognition program. Efforts were made to edit the dictations but occasionally words are mis-transcribed.)           Federico Cantu, MANUEL - CNP  12/19/24 [id number]

## 2024-12-19 NOTE — ED TRIAGE NOTES
Patient ambulated to room with complaint of urine frequency, urgency and pressure that started a few days ago

## 2024-12-22 LAB
BACTERIA UR CULT: ABNORMAL
BACTERIA UR CULT: ABNORMAL
ORGANISM: ABNORMAL

## 2024-12-23 RX ORDER — CEPHALEXIN 500 MG/1
500 CAPSULE ORAL 3 TIMES DAILY
Qty: 15 CAPSULE | Refills: 0 | Status: SHIPPED | OUTPATIENT
Start: 2024-12-23 | End: 2024-12-28

## 2024-12-27 ENCOUNTER — OFFICE VISIT (OUTPATIENT)
Dept: FAMILY MEDICINE CLINIC | Age: 69
End: 2024-12-27

## 2024-12-27 VITALS
BODY MASS INDEX: 23.92 KG/M2 | DIASTOLIC BLOOD PRESSURE: 78 MMHG | WEIGHT: 130 LBS | SYSTOLIC BLOOD PRESSURE: 132 MMHG | HEIGHT: 62 IN | RESPIRATION RATE: 12 BRPM | HEART RATE: 84 BPM

## 2024-12-27 DIAGNOSIS — R31.9 HEMATURIA, UNSPECIFIED TYPE: Primary | ICD-10-CM

## 2024-12-27 LAB
BILIRUBIN, POC: 0.2
BLOOD URINE, POC: NORMAL
CLARITY, POC: CLEAR
COLOR, POC: NORMAL
GLUCOSE URINE, POC: NORMAL MG/DL
KETONES, POC: NORMAL MG/DL
LEUKOCYTE EST, POC: NORMAL
NITRITE, POC: NORMAL
PH, POC: 5
PROTEIN, POC: NORMAL MG/DL
SPECIFIC GRAVITY, POC: 1.01
UROBILINOGEN, POC: 0.2 MG/DL

## 2024-12-27 ASSESSMENT — ENCOUNTER SYMPTOMS
CONSTIPATION: 0
SHORTNESS OF BREATH: 0
SINUS PRESSURE: 0

## 2024-12-27 NOTE — PROGRESS NOTES
Tonya Mckeon (:  1955) is a 69 y.o. female,Established patient, here for evaluation of the following chief complaint(s):  Follow-up (Uti /)         Assessment & Plan       Diagnosis Orders   1. Hematuria, unspecified type  POCT Urinalysis no Micro        See me in a year       Subjective   HPI  We discussed the UTI and the UC changed the macrobid to another due to the culture.  She is feeling better  The BP looks better than UC  Review of Systems   Constitutional:  Negative for fatigue.   HENT:  Negative for sinus pressure.    Eyes:  Negative for visual disturbance.   Respiratory:  Negative for shortness of breath.    Cardiovascular:  Negative for chest pain.   Gastrointestinal:  Negative for constipation.   Genitourinary: Negative.  Negative for decreased urine volume, dysuria and hematuria.   Musculoskeletal:  Negative for arthralgias.   Skin:  Negative for rash.   Neurological:  Negative for headaches.   The patient's medications, allergies, past medical problems, surgical, social, and family histories were reviewed and updated as needed.         Objective   Physical Exam  Constitutional:       Appearance: Normal appearance. She is well-developed.   HENT:      Head: Normocephalic and atraumatic.   Eyes:      General: No scleral icterus.     Conjunctiva/sclera: Conjunctivae normal.   Neck:      Trachea: No tracheal deviation.   Cardiovascular:      Rate and Rhythm: Normal rate.   Pulmonary:      Effort: Pulmonary effort is normal.   Abdominal:      General: Abdomen is flat. Bowel sounds are normal.      Palpations: Abdomen is soft. There is no mass.      Tenderness: There is no abdominal tenderness.   Skin:     General: Skin is warm and dry.   Neurological:      General: No focal deficit present.      Mental Status: She is alert.   Psychiatric:         Behavior: Behavior normal.     Blood pressure 132/78, pulse 84, resp. rate 12, height 1.575 m (5' 2\"), weight 59 kg (130 lb), not currently

## 2025-01-06 ENCOUNTER — TELEPHONE (OUTPATIENT)
Age: 70
End: 2025-01-06

## 2025-01-06 NOTE — TELEPHONE ENCOUNTER
Patient called in stating you advised her to call you regarding a stomach scope that you wanted done because of the medication she is on she states she spoke with  3-4 and is getting no where.  She would like you to know that she has not got this done.  office is telling pt that  will be calling you.   Patient also states she is ready to begin the osteoporosis shot.

## 2025-01-15 ENCOUNTER — CLINICAL DOCUMENTATION (OUTPATIENT)
Age: 70
End: 2025-01-15

## 2025-01-31 ENCOUNTER — CLINICAL DOCUMENTATION (OUTPATIENT)
Age: 70
End: 2025-01-31

## 2025-01-31 RX ORDER — TERIPARATIDE 250 UG/ML
20 INJECTION, SOLUTION SUBCUTANEOUS DAILY
Qty: 2.48 ML | Refills: 0 | Status: SHIPPED | OUTPATIENT
Start: 2025-01-31

## 2025-01-31 NOTE — PROGRESS NOTES
I spoke with patient regarding osteoporosis treatment.  She is still interested in Reclast.  We reviewed side effects including hypercalcemia, kidney stones, gout, osteosarcoma, lightheadedness and dizziness.  She is not having major issues with lightheadedness/dizziness at this point.  Take medication at night.  Let me know if she develops lightheadedness.  She has an appointment in March and she will keep it.

## 2025-02-04 NOTE — TELEPHONE ENCOUNTER
I spoke with and informed patient. She says not is not interested in reclast.  Pt states she is to be taking Forteo

## 2025-02-27 RX ORDER — TERIPARATIDE 250 UG/ML
20 INJECTION, SOLUTION SUBCUTANEOUS DAILY
Qty: 2.48 ML | Refills: 5 | Status: SHIPPED | OUTPATIENT
Start: 2025-02-27

## 2025-03-19 ENCOUNTER — OFFICE VISIT (OUTPATIENT)
Age: 70
End: 2025-03-19

## 2025-03-19 VITALS
DIASTOLIC BLOOD PRESSURE: 68 MMHG | HEART RATE: 88 BPM | SYSTOLIC BLOOD PRESSURE: 116 MMHG | WEIGHT: 131.2 LBS | BODY MASS INDEX: 24.14 KG/M2 | HEIGHT: 62 IN

## 2025-03-19 DIAGNOSIS — E04.2 MULTINODULAR GOITER: ICD-10-CM

## 2025-03-19 DIAGNOSIS — M81.0 MENOPAUSAL OSTEOPOROSIS: Primary | ICD-10-CM

## 2025-03-19 RX ORDER — PEN NEEDLE, DIABETIC 32 GX 1/6"
NEEDLE, DISPOSABLE MISCELLANEOUS
Qty: 100 EACH | Refills: 3 | Status: SHIPPED | OUTPATIENT
Start: 2025-03-19

## 2025-03-19 RX ORDER — NYSTATIN AND TRIAMCINOLONE ACETONIDE 100000; 1 [USP'U]/G; MG/G
OINTMENT TOPICAL
COMMUNITY
Start: 2025-03-10

## 2025-03-19 NOTE — PROGRESS NOTES
Future     Expected Date:   4/2/2025     Expiration Date:   6/19/2025       The risks and benefits of my recommendations, as well as other treatment options were discussed with the patient today. Questions were answered.  Follow up: 6 month and as needed.         Electronically signed by Jaren Ospina MD 3/19/2025 12:01 PM     **This report has been created using voice recognition software. It may   contain minor errors which are inherent in voice recognition technology.**

## 2025-03-20 ENCOUNTER — RESULTS FOLLOW-UP (OUTPATIENT)
Dept: FAMILY MEDICINE CLINIC | Age: 70
End: 2025-03-20

## 2025-03-20 LAB
ALBUMIN: 4.6 G/DL (ref 3.5–5.2)
ALK PHOSPHATASE: 133 U/L (ref 30–146)
ALT SERPL-CCNC: 12 U/L (ref 5–33)
ANION GAP SERPL CALCULATED.3IONS-SCNC: 14 MMOL/L (ref 7–16)
AST SERPL-CCNC: 20 U/L (ref 9–40)
BILIRUB SERPL-MCNC: 0.2 MG/DL
BUN BLDV-MCNC: 19 MG/DL (ref 8–23)
CALCIUM SERPL-MCNC: 9.6 MG/DL (ref 8.6–10.5)
CHLORIDE BLD-SCNC: 102 MMOL/L (ref 96–107)
CO2: 28 MMOL/L (ref 18–32)
CREAT SERPL-MCNC: 0.81 MG/DL (ref 0.51–1.15)
EGFR IF NONAFRICAN AMERICAN: 78 ML/MIN/1.73M2
GLUCOSE: 95 MG/DL (ref 70–100)
POTASSIUM SERPL-SCNC: 4.6 MMOL/L (ref 3.5–5.4)
SODIUM BLD-SCNC: 144 MMOL/L (ref 135–148)
T4 FREE: 1.06 NG/DL (ref 0.8–1.9)
TOTAL PROTEIN: 6.9 G/DL (ref 6–8.3)
TSH SERPL DL<=0.05 MIU/L-ACNC: 2.05 UIU/ML (ref 0.27–4.2)
VITAMIN D 25-HYDROXY: 81 NG/ML (ref 30–100)

## 2025-03-21 NOTE — TELEPHONE ENCOUNTER
----- Message from Dr. Anderson Hemphill MD sent at 3/20/2025  7:39 PM EDT -----  Let her know that the labs looked fine.

## 2025-07-16 ENCOUNTER — OFFICE VISIT (OUTPATIENT)
Dept: FAMILY MEDICINE CLINIC | Age: 70
End: 2025-07-16

## 2025-07-16 VITALS
WEIGHT: 130.5 LBS | SYSTOLIC BLOOD PRESSURE: 132 MMHG | HEART RATE: 88 BPM | RESPIRATION RATE: 16 BRPM | HEIGHT: 62 IN | BODY MASS INDEX: 24.01 KG/M2 | DIASTOLIC BLOOD PRESSURE: 70 MMHG

## 2025-07-16 DIAGNOSIS — M54.2 CERVICAL SPINE PAIN: Primary | ICD-10-CM

## 2025-07-16 DIAGNOSIS — M26.622 ARTHRALGIA OF LEFT TEMPOROMANDIBULAR JOINT: ICD-10-CM

## 2025-07-16 DIAGNOSIS — J47.9 BRONCHIECTASIS WITHOUT COMPLICATION (HCC): ICD-10-CM

## 2025-07-16 PROCEDURE — 1123F ACP DISCUSS/DSCN MKR DOCD: CPT | Performed by: FAMILY MEDICINE

## 2025-07-16 PROCEDURE — G8399 PT W/DXA RESULTS DOCUMENT: HCPCS | Performed by: FAMILY MEDICINE

## 2025-07-16 PROCEDURE — G8420 CALC BMI NORM PARAMETERS: HCPCS | Performed by: FAMILY MEDICINE

## 2025-07-16 PROCEDURE — 3017F COLORECTAL CA SCREEN DOC REV: CPT | Performed by: FAMILY MEDICINE

## 2025-07-16 PROCEDURE — 1090F PRES/ABSN URINE INCON ASSESS: CPT | Performed by: FAMILY MEDICINE

## 2025-07-16 PROCEDURE — 99214 OFFICE O/P EST MOD 30 MIN: CPT | Performed by: FAMILY MEDICINE

## 2025-07-16 PROCEDURE — G8427 DOCREV CUR MEDS BY ELIG CLIN: HCPCS | Performed by: FAMILY MEDICINE

## 2025-07-16 SDOH — ECONOMIC STABILITY: FOOD INSECURITY: WITHIN THE PAST 12 MONTHS, YOU WORRIED THAT YOUR FOOD WOULD RUN OUT BEFORE YOU GOT MONEY TO BUY MORE.: NEVER TRUE

## 2025-07-16 SDOH — ECONOMIC STABILITY: FOOD INSECURITY: WITHIN THE PAST 12 MONTHS, THE FOOD YOU BOUGHT JUST DIDN'T LAST AND YOU DIDN'T HAVE MONEY TO GET MORE.: NEVER TRUE

## 2025-07-16 ASSESSMENT — PATIENT HEALTH QUESTIONNAIRE - PHQ9
SUM OF ALL RESPONSES TO PHQ QUESTIONS 1-9: 0
1. LITTLE INTEREST OR PLEASURE IN DOING THINGS: NOT AT ALL
2. FEELING DOWN, DEPRESSED OR HOPELESS: NOT AT ALL

## 2025-07-16 ASSESSMENT — ENCOUNTER SYMPTOMS
SHORTNESS OF BREATH: 0
CONSTIPATION: 0
SINUS PRESSURE: 0
VOICE CHANGE: 1

## 2025-07-16 NOTE — PROGRESS NOTES
Tonya Mckeon (:  1955) is a 70 y.o. female,Established patient, here for evaluation of the following chief complaint(s):  Orders (Pt states that chiropractor requests pt to get an xray for neck and jaw )         Assessment & Plan  Cervical spine pain       Orders:    XR CERVICAL SPINE (4-5 VIEWS); Future    Arthralgia of left temporomandibular joint       Orders:    XR PANOREX; Future    Bronchiectasis without complication (HCC)            Get the xrays  See me in December          Subjective   HPI  She states that in late March the chronic neck pain worsened.   It can go into the left shoulder to the deltoid  She has been seeing he chiropractor and got a massage.  It affects her sleep, she had to limit her outside work  She uses ice and does exercises  There will be a tingle, burn like feeling  She continues with the pulmonologist for the bronchiectasis  Review of Systems   Constitutional:  Negative for fatigue.   HENT:  Positive for voice change. Negative for sinus pressure.    Eyes:  Negative for visual disturbance.   Respiratory:  Negative for shortness of breath.    Cardiovascular:  Negative for chest pain.   Gastrointestinal:  Negative for constipation.   Genitourinary: Negative.    Musculoskeletal:  Positive for arthralgias, gait problem, joint swelling, neck pain and neck stiffness.   Skin:  Negative for rash.   Neurological:  Negative for headaches.   The patient's medications, allergies, past medical problems, surgical, social, and family histories were reviewed and updated as needed.         Objective   Physical Exam  Constitutional:       Appearance: Normal appearance. She is well-developed.   HENT:      Head: Normocephalic and atraumatic.   Eyes:      General: No scleral icterus.     Conjunctiva/sclera: Conjunctivae normal.   Neck:      Trachea: No tracheal deviation.        Comments: Full flexion and left rotation. Sore at the extreme of extension and right rotation.  Cardiovascular:

## 2025-07-19 ENCOUNTER — RESULTS FOLLOW-UP (OUTPATIENT)
Dept: FAMILY MEDICINE CLINIC | Age: 70
End: 2025-07-19

## 2025-07-21 ENCOUNTER — OFFICE VISIT (OUTPATIENT)
Dept: NEUROLOGY | Age: 70
End: 2025-07-21
Payer: MEDICARE

## 2025-07-21 VITALS
OXYGEN SATURATION: 96 % | BODY MASS INDEX: 24.13 KG/M2 | DIASTOLIC BLOOD PRESSURE: 66 MMHG | HEART RATE: 87 BPM | HEIGHT: 62 IN | SYSTOLIC BLOOD PRESSURE: 118 MMHG | WEIGHT: 131.13 LBS

## 2025-07-21 DIAGNOSIS — G25.0 ESSENTIAL TREMOR: Primary | ICD-10-CM

## 2025-07-21 DIAGNOSIS — R49.8 VOICE TREMOR: ICD-10-CM

## 2025-07-21 PROCEDURE — G8427 DOCREV CUR MEDS BY ELIG CLIN: HCPCS | Performed by: PSYCHIATRY & NEUROLOGY

## 2025-07-21 PROCEDURE — 3017F COLORECTAL CA SCREEN DOC REV: CPT | Performed by: PSYCHIATRY & NEUROLOGY

## 2025-07-21 PROCEDURE — G8420 CALC BMI NORM PARAMETERS: HCPCS | Performed by: PSYCHIATRY & NEUROLOGY

## 2025-07-21 PROCEDURE — 1123F ACP DISCUSS/DSCN MKR DOCD: CPT | Performed by: PSYCHIATRY & NEUROLOGY

## 2025-07-21 PROCEDURE — 1159F MED LIST DOCD IN RCRD: CPT | Performed by: PSYCHIATRY & NEUROLOGY

## 2025-07-21 PROCEDURE — G8399 PT W/DXA RESULTS DOCUMENT: HCPCS | Performed by: PSYCHIATRY & NEUROLOGY

## 2025-07-21 PROCEDURE — 99214 OFFICE O/P EST MOD 30 MIN: CPT | Performed by: PSYCHIATRY & NEUROLOGY

## 2025-07-21 PROCEDURE — 1036F TOBACCO NON-USER: CPT | Performed by: PSYCHIATRY & NEUROLOGY

## 2025-07-21 PROCEDURE — 1090F PRES/ABSN URINE INCON ASSESS: CPT | Performed by: PSYCHIATRY & NEUROLOGY

## 2025-07-21 RX ORDER — BENZTROPINE MESYLATE 0.5 MG/1
0.25 TABLET ORAL DAILY
Qty: 15 TABLET | Refills: 1 | Status: SHIPPED | OUTPATIENT
Start: 2025-07-21

## 2025-07-21 NOTE — PATIENT INSTRUCTIONS
Start Cogentin 0.25 mg daily.   Continue following with [facility].   Follow up in 2 months or sooner if needed.  Call if any questions or concerns.

## 2025-07-21 NOTE — PROGRESS NOTES
NEUROLOGY OUT PATIENT FOLLOW UP NOTE:  7/21/202512:38 PM    Tonya Mckeon is here for follow up for essential tremor, she is here to go over plan.        Allergies   Allergen Reactions    Latex Hives    Prolia [Denosumab] Other (See Comments)     Hand and feet tingle and throat tightness       Current Outpatient Medications:     nystatin-triamcinolone (MYCOLOG) 609265-7.1 UNIT/GM-% ointment, APPLY 1 APPLICATION TOPICALLY TO LIP TWICE DAILY FOR 2 WEEKS THEN USE AS NEEDED, Disp: , Rfl:     VITAMIN K PO, Take by mouth, Disp: , Rfl:     Insulin Pen Needle (NOVOFINE PLUS PEN NEEDLE) 32G X 4 MM MISC, Use 1 daily, Disp: 100 each, Rfl: 3    teriparatide (FORTEO) 620 MCG/2.48ML SOPN injection, Inject 0.08 mLs into the skin daily, Disp: 2.48 mL, Rfl: 5    montelukast (SINGULAIR) 10 MG tablet, , Disp: , Rfl:     Elastic Bandages & Supports (KNEE BRACE) MISC, Knee brace, Disp: 1 each, Rfl: 2    albuterol sulfate HFA (PROVENTIL;VENTOLIN;PROAIR) 108 (90 Base) MCG/ACT inhaler, Inhale 2 puffs into the lungs 4 times daily, Disp: , Rfl:     ZINC PO, Take by mouth daily , Disp: , Rfl:     TURMERIC PO, Take by mouth 2 times daily , Disp: , Rfl:     docusate sodium (COLACE) 100 MG capsule, Take 1 capsule by mouth 2 times daily as needed, Disp: , Rfl:     Calcium Citrate-Vitamin D (CALCIUM CITRATE + PO), Take 630 mg by mouth daily. Two daily, Disp: , Rfl:     magnesium 30 MG tablet, Take 1 tablet by mouth daily, Disp: , Rfl:     Coenzyme Q10 (COQ10 PO), Take 100 mg by mouth daily , Disp: , Rfl:     Omega-3 Fatty Acids (OMEGA 3 PO), Take by mouth 2 times daily , Disp: , Rfl:     multivitamin (THERAGRAN) per tablet, Take 1 tablet by mouth daily, Disp: , Rfl:     MILK THISTLE PO, Take 240 mg by mouth in the morning and at bedtime (Patient not taking: Reported on 7/21/2025), Disp: , Rfl:     Magnesium Sulfate, Laxative, (EPSOM SALT) GRAN, Warm water soaks (Patient not taking: Reported on 7/21/2025), Disp: 1 g, Rfl: 0    I reviewed the

## 2025-07-22 NOTE — TELEPHONE ENCOUNTER
----- Message from Dr. Anderson Hemphill MD sent at 7/19/2025 11:58 AM EDT -----  Let her know that the xrays of the neck show arthritic changes.

## 2025-08-18 DIAGNOSIS — M81.0 MENOPAUSAL OSTEOPOROSIS: Primary | ICD-10-CM

## 2025-08-18 RX ORDER — TERIPARATIDE 250 UG/ML
INJECTION, SOLUTION SUBCUTANEOUS
COMMUNITY
Start: 2025-07-17

## 2025-08-18 RX ORDER — TERIPARATIDE 250 UG/ML
INJECTION, SOLUTION SUBCUTANEOUS
Qty: 2.24 ML | Refills: 1 | Status: SHIPPED | OUTPATIENT
Start: 2025-08-18

## 2025-08-18 RX ORDER — TERIPARATIDE 250 UG/ML
INJECTION, SOLUTION SUBCUTANEOUS
OUTPATIENT
Start: 2025-08-18

## (undated) DEVICE — GLOVE SURG SZ 8 L11.77IN FNGR THK9.8MIL STRW LTX POLYMER

## (undated) DEVICE — GOWN,SIRUS,NON REINFRCD,LARGE,SET IN SL: Brand: MEDLINE

## (undated) DEVICE — COTTON BALL ST

## (undated) DEVICE — GLOVE ORANGE PI 7   MSG9070

## (undated) DEVICE — SUTURE MCRYL SZ 4-0 L18IN ABSRB UD P-3 L13MM 3/8 CIR PRIM Y494G

## (undated) DEVICE — PACK PROCEDURE SURG PLAS SC MIN SRHP LF

## (undated) DEVICE — SUTURE NONABSORBABLE BRAIDED 4-0 SH 30 IN BLK PERMA HND K831H

## (undated) DEVICE — BANDAGE,GAUZE,4.5"X4.1YD,STERILE,LF: Brand: MEDLINE